# Patient Record
Sex: MALE | Race: ASIAN | NOT HISPANIC OR LATINO | URBAN - METROPOLITAN AREA
[De-identification: names, ages, dates, MRNs, and addresses within clinical notes are randomized per-mention and may not be internally consistent; named-entity substitution may affect disease eponyms.]

---

## 2024-02-18 ENCOUNTER — INPATIENT (INPATIENT)
Facility: HOSPITAL | Age: 64
LOS: 0 days | Discharge: HOME | DRG: 322 | End: 2024-02-19
Attending: INTERNAL MEDICINE | Admitting: INTERNAL MEDICINE
Payer: COMMERCIAL

## 2024-02-18 VITALS
RESPIRATION RATE: 18 BRPM | SYSTOLIC BLOOD PRESSURE: 148 MMHG | TEMPERATURE: 98 F | HEART RATE: 82 BPM | WEIGHT: 199.96 LBS | DIASTOLIC BLOOD PRESSURE: 80 MMHG | OXYGEN SATURATION: 99 %

## 2024-02-18 DIAGNOSIS — I20.0 UNSTABLE ANGINA: ICD-10-CM

## 2024-02-18 DIAGNOSIS — E78.5 HYPERLIPIDEMIA, UNSPECIFIED: ICD-10-CM

## 2024-02-18 LAB
ANION GAP SERPL CALC-SCNC: 8 MMOL/L — SIGNIFICANT CHANGE UP (ref 5–17)
ANISOCYTOSIS BLD QL: SLIGHT — SIGNIFICANT CHANGE UP
APTT BLD: 28.8 SEC — SIGNIFICANT CHANGE UP (ref 24.5–35.6)
BASOPHILS # BLD AUTO: 0.23 K/UL — HIGH (ref 0–0.2)
BASOPHILS NFR BLD AUTO: 1.2 % — SIGNIFICANT CHANGE UP (ref 0–2)
BUN SERPL-MCNC: 23 MG/DL — SIGNIFICANT CHANGE UP (ref 7–23)
CALCIUM SERPL-MCNC: 8.7 MG/DL — SIGNIFICANT CHANGE UP (ref 8.4–10.5)
CHLORIDE SERPL-SCNC: 100 MMOL/L — SIGNIFICANT CHANGE UP (ref 96–108)
CK MB CFR SERPL CALC: 2.1 NG/ML — SIGNIFICANT CHANGE UP (ref 0–6.7)
CK SERPL-CCNC: 92 U/L — SIGNIFICANT CHANGE UP (ref 30–200)
CO2 SERPL-SCNC: 28 MMOL/L — SIGNIFICANT CHANGE UP (ref 22–31)
CREAT SERPL-MCNC: 0.85 MG/DL — SIGNIFICANT CHANGE UP (ref 0.5–1.3)
DACRYOCYTES BLD QL SMEAR: SLIGHT — SIGNIFICANT CHANGE UP
EGFR: 99 ML/MIN/1.73M2 — SIGNIFICANT CHANGE UP
EOSINOPHIL # BLD AUTO: 0 K/UL — SIGNIFICANT CHANGE UP (ref 0–0.5)
EOSINOPHIL NFR BLD AUTO: 0 % — SIGNIFICANT CHANGE UP (ref 0–6)
GLUCOSE SERPL-MCNC: 146 MG/DL — HIGH (ref 70–99)
HCT VFR BLD CALC: 43.9 % — SIGNIFICANT CHANGE UP (ref 39–50)
HGB BLD-MCNC: 14.3 G/DL — SIGNIFICANT CHANGE UP (ref 13–17)
INR BLD: 0.94 — SIGNIFICANT CHANGE UP (ref 0.85–1.18)
ISTAT ACTK (ACTIVATED CLOTTING TIME KAOLIN): 255 SEC — HIGH (ref 74–137)
ISTAT ACTK (ACTIVATED CLOTTING TIME KAOLIN): 260 SEC — HIGH (ref 74–137)
ISTAT ACTK (ACTIVATED CLOTTING TIME KAOLIN): 277 SEC — HIGH (ref 74–137)
LYMPHOCYTES # BLD AUTO: 43.5 % — SIGNIFICANT CHANGE UP (ref 13–44)
LYMPHOCYTES # BLD AUTO: 8.43 K/UL — HIGH (ref 1–3.3)
MANUAL SMEAR VERIFICATION: SIGNIFICANT CHANGE UP
MCHC RBC-ENTMCNC: 30.4 PG — SIGNIFICANT CHANGE UP (ref 27–34)
MCHC RBC-ENTMCNC: 32.6 GM/DL — SIGNIFICANT CHANGE UP (ref 32–36)
MCV RBC AUTO: 93.2 FL — SIGNIFICANT CHANGE UP (ref 80–100)
MICROCYTES BLD QL: SLIGHT — SIGNIFICANT CHANGE UP
MONOCYTES # BLD AUTO: 0.91 K/UL — HIGH (ref 0–0.9)
MONOCYTES NFR BLD AUTO: 4.7 % — SIGNIFICANT CHANGE UP (ref 2–14)
NEUTROPHILS # BLD AUTO: 9.8 K/UL — HIGH (ref 1.8–7.4)
NEUTROPHILS NFR BLD AUTO: 50.6 % — SIGNIFICANT CHANGE UP (ref 43–77)
OVALOCYTES BLD QL SMEAR: SLIGHT — SIGNIFICANT CHANGE UP
PLAT MORPH BLD: NORMAL — SIGNIFICANT CHANGE UP
PLATELET # BLD AUTO: 237 K/UL — SIGNIFICANT CHANGE UP (ref 150–400)
POIKILOCYTOSIS BLD QL AUTO: SLIGHT — SIGNIFICANT CHANGE UP
POLYCHROMASIA BLD QL SMEAR: SLIGHT — SIGNIFICANT CHANGE UP
POTASSIUM SERPL-MCNC: 4.3 MMOL/L — SIGNIFICANT CHANGE UP (ref 3.5–5.3)
POTASSIUM SERPL-SCNC: 4.3 MMOL/L — SIGNIFICANT CHANGE UP (ref 3.5–5.3)
PROTHROM AB SERPL-ACNC: 10.7 SEC — SIGNIFICANT CHANGE UP (ref 9.5–13)
RBC # BLD: 4.71 M/UL — SIGNIFICANT CHANGE UP (ref 4.2–5.8)
RBC # FLD: 13.2 % — SIGNIFICANT CHANGE UP (ref 10.3–14.5)
RBC BLD AUTO: ABNORMAL
SMUDGE CELLS # BLD: PRESENT — SIGNIFICANT CHANGE UP
SODIUM SERPL-SCNC: 136 MMOL/L — SIGNIFICANT CHANGE UP (ref 135–145)
SPHEROCYTES BLD QL SMEAR: SLIGHT — SIGNIFICANT CHANGE UP
TROPONIN T, HIGH SENSITIVITY RESULT: 57 NG/L — CRITICAL HIGH (ref 0–51)
WBC # BLD: 19.37 K/UL — HIGH (ref 3.8–10.5)
WBC # FLD AUTO: 19.37 K/UL — HIGH (ref 3.8–10.5)

## 2024-02-18 PROCEDURE — 99152 MOD SED SAME PHYS/QHP 5/>YRS: CPT

## 2024-02-18 PROCEDURE — 99285 EMERGENCY DEPT VISIT HI MDM: CPT

## 2024-02-18 PROCEDURE — 93571 IV DOP VEL&/PRESS C FLO 1ST: CPT | Mod: 26,52,LD

## 2024-02-18 PROCEDURE — 93458 L HRT ARTERY/VENTRICLE ANGIO: CPT | Mod: 26,59

## 2024-02-18 PROCEDURE — 71045 X-RAY EXAM CHEST 1 VIEW: CPT | Mod: 26

## 2024-02-18 PROCEDURE — 92928 PRQ TCAT PLMT NTRAC ST 1 LES: CPT | Mod: RC

## 2024-02-18 RX ORDER — METOPROLOL TARTRATE 50 MG
1 TABLET ORAL
Refills: 0 | DISCHARGE

## 2024-02-18 RX ORDER — SODIUM CHLORIDE 9 MG/ML
1000 INJECTION INTRAMUSCULAR; INTRAVENOUS; SUBCUTANEOUS
Refills: 0 | Status: DISCONTINUED | OUTPATIENT
Start: 2024-02-18 | End: 2024-02-19

## 2024-02-18 RX ORDER — INFLUENZA VIRUS VACCINE 15; 15; 15; 15 UG/.5ML; UG/.5ML; UG/.5ML; UG/.5ML
0.5 SUSPENSION INTRAMUSCULAR ONCE
Refills: 0 | Status: DISCONTINUED | OUTPATIENT
Start: 2024-02-18 | End: 2024-02-19

## 2024-02-18 RX ORDER — CLOPIDOGREL BISULFATE 75 MG/1
75 TABLET, FILM COATED ORAL DAILY
Refills: 0 | Status: DISCONTINUED | OUTPATIENT
Start: 2024-02-19 | End: 2024-02-19

## 2024-02-18 RX ORDER — METOPROLOL TARTRATE 50 MG
25 TABLET ORAL DAILY
Refills: 0 | Status: DISCONTINUED | OUTPATIENT
Start: 2024-02-18 | End: 2024-02-19

## 2024-02-18 RX ORDER — CLOPIDOGREL BISULFATE 75 MG/1
600 TABLET, FILM COATED ORAL ONCE
Refills: 0 | Status: COMPLETED | OUTPATIENT
Start: 2024-02-18 | End: 2024-02-18

## 2024-02-18 RX ORDER — ATORVASTATIN CALCIUM 80 MG/1
80 TABLET, FILM COATED ORAL AT BEDTIME
Refills: 0 | Status: DISCONTINUED | OUTPATIENT
Start: 2024-02-18 | End: 2024-02-19

## 2024-02-18 RX ORDER — ASPIRIN/CALCIUM CARB/MAGNESIUM 324 MG
81 TABLET ORAL DAILY
Refills: 0 | Status: DISCONTINUED | OUTPATIENT
Start: 2024-02-19 | End: 2024-02-19

## 2024-02-18 RX ORDER — ASPIRIN/CALCIUM CARB/MAGNESIUM 324 MG
324 TABLET ORAL ONCE
Refills: 0 | Status: COMPLETED | OUTPATIENT
Start: 2024-02-18 | End: 2024-02-18

## 2024-02-18 RX ADMIN — Medication 324 MILLIGRAM(S): at 19:28

## 2024-02-18 RX ADMIN — SODIUM CHLORIDE 75 MILLILITER(S): 9 INJECTION INTRAMUSCULAR; INTRAVENOUS; SUBCUTANEOUS at 22:14

## 2024-02-18 RX ADMIN — ATORVASTATIN CALCIUM 80 MILLIGRAM(S): 80 TABLET, FILM COATED ORAL at 22:07

## 2024-02-18 RX ADMIN — CLOPIDOGREL BISULFATE 600 MILLIGRAM(S): 75 TABLET, FILM COATED ORAL at 19:27

## 2024-02-18 RX ADMIN — Medication 25 MILLIGRAM(S): at 22:08

## 2024-02-18 NOTE — PATIENT PROFILE ADULT - FALL HARM RISK - HARM RISK INTERVENTIONS

## 2024-02-18 NOTE — H&P ADULT - PROBLEM SELECTOR PLAN 1
- HsTrop T 57, CK/CKMB WNL   - s/p cardiac cath 2/18/24: RAISA pRCA 70 %, RAISA mRCA 99%, RAISA Ramus 80-90 %, dRCA oatent, mild LI LM, Mild disease LCx, pLAD stent mild ISR, mLAD stent mild ISR, 40-50 % stenosis prior to pLAD stent neg IFR, EDP 14, EF normal by ECHO, R TR access  - o/p TTE 02/2024 as per Dr. Bray: normal EF with no valve disease  - o/p NST 02/2024 as per Dr. Bray: large inferolateral ischemia  - s/p ASA/Plavix load in ER  - c/w ASA 81 mg daily, Plavix 75 mg daily, BB, statin  - f/u am A1c - HsTrop T 57, CK/CKMB WNL   - s/p cardiac cath 2/18/24: RAISA pRCA 70 %, RAISA mRCA 99%, RAISA Ramus 80-90 %, dRCA oatent, mild LI LM, Mild disease LCx, pLAD stent mild ISR, mLAD stent mild ISR, 40-50 % stenosis prior to pLAD stent neg IFR, EDP 14, EF normal by ECHO, R TR access  - o/p TTE 02/2024 as per Dr. Bray: normal EF with no valve disease  - o/p NST 02/2024 as per Dr. Bray: large inferolateral ischemia  - s/p ASA/Plavix load in ER  - c/w ASA 81 mg daily, Plavix 75 mg daily, Toprol 25 mg daily, statin  - f/u am A1c

## 2024-02-18 NOTE — H&P ADULT - HISTORY OF PRESENT ILLNESS
Pt seen post cath    Cardiologist: Dr. Bray  Pharmacy:    62 y/o M w/ PMH of HLD, CAD s/p PCI  who is referred to [facility] ED 2/18/24 by his cardiologist Dr. Bray with CC of CP X 10 days. Pt  in [place] visiting family and reports having on and off CP lasting few minutes before resolving on its own, , described as  burning/tightness which starts mid-sternal radiating to his L side and across his entire chest occurring w/ exercise and after eating, reports CP similar to prior CP when he had stents placed. Felt sth was wrong with his heart, flew back to [place] __. saw Dr. Bray, Rx Imdur/BB w/o relief. As per Dr. Bray o/p ECHO: normal EF w/ no valve disease, o/p NST with large inferolateral ischemia.  Pt, denies any SOB, dizziness, palpitation, N/V, LE edema, PND/orthopnea and syncope.  In ED, /80 HR 82 RR 18 T 98.1 02 99 RALabs revealed HsTropT 57, CK/CKMB WNL EKG: NSR @ 81 bpm, RBBB In ED, Pt received  mg PO x 1 and Plavix 600 mg PO x 1 and is now admitted to cardiac tele for UA  w/ plan for cardiac cath w/ possible intervention 2/2 known pt risk factors, CCS class 3 sx and abnormal NST.     Pt seen post cath    Cardiologist: Dr. Bray  Pharmacy: [facility]    64 y/o M w/ strong FHx of CAD,  PMH of HLD, CAD s/p PCI  who is referred to [facility] ED 2/18/24 by his cardiologist Dr. Bray with CC of CP X 10 days. Pt  in [place] visiting family 11/25-2/15 and reports having on and off CP lasting few minutes before resolving on its own, , described as  burning/tightness which starts mid-sternal radiating to his L side and across his entire chest occurring w/ exercise and after eating, reports CP similar to prior CP when he had stents placed. Felt sth was wrong with his heart, flew back to [place] Feb15, 2024. saw Dr. Bray, Rx SL NTG/BB w/o relief.  Pt As per Dr. Bray o/p ECHO: normal EF w/ no valve disease, o/p NST with large inferolateral ischemia.  Pt, denies any SOB, dizziness, palpitation, N/V, LE edema, PND/orthopnea and syncope.  In ED, /80 HR 82 RR 18 T 98.1 02 99 RALabs revealed HsTropT 57, CK/CKMB WNL EKG: NSR @ 81 bpm, RBBB In ED, Pt received  mg PO x 1 and Plavix 600 mg PO x 1 and is now admitted to cardiac tele for UA  w/ plan for cardiac cath w/ possible intervention 2/2 known pt risk factors, CCS class 3 sx and abnormal NST.

## 2024-02-18 NOTE — ED PROVIDER NOTE - OBJECTIVE STATEMENT
63yM w/ CAD (5 stents prior), HLD comes in for 10 days chest pain- started while he was in [place], began feeling burning and tightness across his entire chest which happened w/ exercise and after eating. No SOB. Says it felt similar and different to his prior chest pain when needed stents. Felt like something was wrong w/ his heart so flew back to [place].  Follows w/ Dr Patel who was giving pt imdur and metoprolol w/o relief so sent pt to [facility] for cardiac cath tonight for unstable angina.  Nonsmoker.

## 2024-02-18 NOTE — ED PROVIDER NOTE - CLINICAL SUMMARY MEDICAL DECISION MAKING FREE TEXT BOX
Normal VS here  pt w/ mild burning in his chest at this time  EKG normal sinus w/ RBBB  discussed w/ Dr Patel who is en route to hospital for cath  will load w/ aspirin/plavix, also will give statin  labs and cxr ordered Normal VS here  pt w/ mild burning in his chest at this time  EKG normal sinus w/ RBBB  discussed w/ Dr Diaz who is en route to hospital for cath  will load w/ aspirin/plavix, also will give statin  not concerned for PE based on presentation/no SOB as well as normal vitals (no tachycardia or hypoxia), plus pt flew back from [place] AFTER symptoms started in order to see dr diaz  labs and cxr ordered, signed out to cards team Normal VS here  pt w/ mild burning in his chest at this time  EKG normal sinus w/ RBBB (per Dr Diaz this is known RBBB)  discussed w/ Dr Diaz who is en route to hospital for cath  will load w/ aspirin/plavix, also will give statin  not concerned for PE based on presentation/no SOB as well as normal vitals (no tachycardia or hypoxia), plus pt flew back from [place] AFTER symptoms started in order to see dr diaz  labs and cxr ordered, signed out to cards team

## 2024-02-18 NOTE — ED ADULT NURSE NOTE - OBJECTIVE STATEMENT
Patient came to ER stating "I have had chest pain for 10 days but I was away and just came back and I was told to come to ER urgently". Patient c/o chest pain, no sob, dizziness or weakness.

## 2024-02-18 NOTE — H&P ADULT - NSICDXFAMILYHX_GEN_ALL_CORE_FT
FAMILY HISTORY:  Mother  Still living? Unknown  FH: CAD (coronary artery disease), Age at diagnosis: Age Unknown    Grandparent  Still living? Unknown  FH: myocardial infarction, Age at diagnosis: Age Unknown    Uncle  Still living? Unknown  FH: myocardial infarction, Age at diagnosis: Age Unknown

## 2024-02-18 NOTE — H&P ADULT - NSHPLABSRESULTS_GEN_ALL_CORE
14.3   19.37 )-----------( 237      ( 18 Feb 2024 19:01 )             43.9   02-18    136  |  100  |  23  ----------------------------<  146<H>  4.3   |  28  |  0.85    Ca    8.7      18 Feb 2024 19:01    PT/INR - ( 18 Feb 2024 19:01 )   PT: 10.7 sec;   INR: 0.94          PTT - ( 18 Feb 2024 19:01 )  PTT:28.8 sec

## 2024-02-18 NOTE — H&P ADULT - PROBLEM SELECTOR PLAN 2
- c/w Lipitor 80 mg daily IC from home med: Crestor 40 mg daily (non-formulary)  - f/u am lipid profile    DVT PPX: SCD  Dispo: monitor o/n, likely d/c in am.

## 2024-02-18 NOTE — H&P ADULT - ASSESSMENT
64 y/o M w/ PMH of HLD, CAD s/p PCI  who is now admitted to cardiac tele for UA  w/ plan for cardiac cath w/ possible intervention 2/2 known pt risk factors, CCS class 3 sx and abnormal NST.  62 y/o M w/ strong Fhx of CAD, PMH of HLD, CAD s/p PCI  who is now admitted to cardiac tele for UA  w/ plan for cardiac cath w/ possible intervention 2/2 known pt risk factors, CCS class 3 sx and abnormal NST.

## 2024-02-18 NOTE — ED PROVIDER NOTE - PHYSICAL EXAMINATION
CONST: nontoxic NAD speaking in full sentences  HEAD: atraumatic  EYES: conjunctivae clear  NECK: supple  CARD: regular rate  CHEST: breathing comfortably, no stridor/retractions/tripoding  EXT: FROM  SKIN: warm, dry  NEURO: awake alert answering questions following commands moving all extremities CONST: nontoxic NAD speaking in full sentences comfortable appearing  HEAD: atraumatic  EYES: conjunctivae clear  NECK: supple  CARD: regular rate  CHEST: breathing comfortably, no stridor/retractions/tripoding  EXT: FROM  SKIN: warm, dry  NEURO: awake alert answering questions following commands moving all extremities

## 2024-02-18 NOTE — ED ADULT NURSE NOTE - NSFALLHARMRISKINTERV_ED_ALL_ED

## 2024-02-18 NOTE — ED ADULT TRIAGE NOTE - BP NONINVASIVE DIASTOLIC (MM HG)
LOV: 5/10/21  Next OV: None  Rx: azithromycin (Zithromax Z-Alen) 250 MG tablet - 2 tabs day 1, then 1 tab days 2-5  Last refill date: 7/6/21  Qty: 60  # of refills: 0  Last labwork done: 9/28/20    Script loaded pending your approval.   80

## 2024-02-19 VITALS
HEART RATE: 94 BPM | RESPIRATION RATE: 18 BRPM | DIASTOLIC BLOOD PRESSURE: 82 MMHG | SYSTOLIC BLOOD PRESSURE: 128 MMHG | TEMPERATURE: 98 F | OXYGEN SATURATION: 99 %

## 2024-02-19 LAB
A1C WITH ESTIMATED AVERAGE GLUCOSE RESULT: 5.9 % — HIGH (ref 4–5.6)
ALBUMIN SERPL ELPH-MCNC: 3.6 G/DL — SIGNIFICANT CHANGE UP (ref 3.3–5)
ALP SERPL-CCNC: 75 U/L — SIGNIFICANT CHANGE UP (ref 40–120)
ALT FLD-CCNC: 15 U/L — SIGNIFICANT CHANGE UP (ref 10–45)
ANION GAP SERPL CALC-SCNC: 8 MMOL/L — SIGNIFICANT CHANGE UP (ref 5–17)
AST SERPL-CCNC: 22 U/L — SIGNIFICANT CHANGE UP (ref 10–40)
BASOPHILS # BLD AUTO: 0.06 K/UL — SIGNIFICANT CHANGE UP (ref 0–0.2)
BASOPHILS NFR BLD AUTO: 0.3 % — SIGNIFICANT CHANGE UP (ref 0–2)
BILIRUB SERPL-MCNC: 0.3 MG/DL — SIGNIFICANT CHANGE UP (ref 0.2–1.2)
BUN SERPL-MCNC: 14 MG/DL — SIGNIFICANT CHANGE UP (ref 7–23)
CALCIUM SERPL-MCNC: 8.3 MG/DL — LOW (ref 8.4–10.5)
CHLORIDE SERPL-SCNC: 103 MMOL/L — SIGNIFICANT CHANGE UP (ref 96–108)
CHOLEST SERPL-MCNC: 111 MG/DL — SIGNIFICANT CHANGE UP
CO2 SERPL-SCNC: 24 MMOL/L — SIGNIFICANT CHANGE UP (ref 22–31)
CREAT SERPL-MCNC: 0.69 MG/DL — SIGNIFICANT CHANGE UP (ref 0.5–1.3)
EGFR: 105 ML/MIN/1.73M2 — SIGNIFICANT CHANGE UP
EOSINOPHIL # BLD AUTO: 0.17 K/UL — SIGNIFICANT CHANGE UP (ref 0–0.5)
EOSINOPHIL NFR BLD AUTO: 0.9 % — SIGNIFICANT CHANGE UP (ref 0–6)
ESTIMATED AVERAGE GLUCOSE: 123 MG/DL — HIGH (ref 68–114)
GLUCOSE SERPL-MCNC: 106 MG/DL — HIGH (ref 70–99)
HCT VFR BLD CALC: 40.3 % — SIGNIFICANT CHANGE UP (ref 39–50)
HCV AB S/CO SERPL IA: 0.04 S/CO — SIGNIFICANT CHANGE UP
HCV AB SERPL-IMP: SIGNIFICANT CHANGE UP
HDLC SERPL-MCNC: 39 MG/DL — LOW
HGB BLD-MCNC: 13.1 G/DL — SIGNIFICANT CHANGE UP (ref 13–17)
IMM GRANULOCYTES NFR BLD AUTO: 0.3 % — SIGNIFICANT CHANGE UP (ref 0–0.9)
LIPID PNL WITH DIRECT LDL SERPL: 59 MG/DL — SIGNIFICANT CHANGE UP
LYMPHOCYTES # BLD AUTO: 10.38 K/UL — HIGH (ref 1–3.3)
LYMPHOCYTES # BLD AUTO: 58 % — HIGH (ref 13–44)
MAGNESIUM SERPL-MCNC: 2 MG/DL — SIGNIFICANT CHANGE UP (ref 1.6–2.6)
MCHC RBC-ENTMCNC: 29.8 PG — SIGNIFICANT CHANGE UP (ref 27–34)
MCHC RBC-ENTMCNC: 32.5 GM/DL — SIGNIFICANT CHANGE UP (ref 32–36)
MCV RBC AUTO: 91.8 FL — SIGNIFICANT CHANGE UP (ref 80–100)
MONOCYTES # BLD AUTO: 0.73 K/UL — SIGNIFICANT CHANGE UP (ref 0–0.9)
MONOCYTES NFR BLD AUTO: 4.1 % — SIGNIFICANT CHANGE UP (ref 2–14)
NEUTROPHILS # BLD AUTO: 6.52 K/UL — SIGNIFICANT CHANGE UP (ref 1.8–7.4)
NEUTROPHILS NFR BLD AUTO: 36.4 % — LOW (ref 43–77)
NON HDL CHOLESTEROL: 72 MG/DL — SIGNIFICANT CHANGE UP
NRBC # BLD: 0 /100 WBCS — SIGNIFICANT CHANGE UP (ref 0–0)
PLATELET # BLD AUTO: 207 K/UL — SIGNIFICANT CHANGE UP (ref 150–400)
POTASSIUM SERPL-MCNC: 4.2 MMOL/L — SIGNIFICANT CHANGE UP (ref 3.5–5.3)
POTASSIUM SERPL-SCNC: 4.2 MMOL/L — SIGNIFICANT CHANGE UP (ref 3.5–5.3)
PROT SERPL-MCNC: 6.5 G/DL — SIGNIFICANT CHANGE UP (ref 6–8.3)
RBC # BLD: 4.39 M/UL — SIGNIFICANT CHANGE UP (ref 4.2–5.8)
RBC # FLD: 13.6 % — SIGNIFICANT CHANGE UP (ref 10.3–14.5)
SODIUM SERPL-SCNC: 135 MMOL/L — SIGNIFICANT CHANGE UP (ref 135–145)
TRIGL SERPL-MCNC: 66 MG/DL — SIGNIFICANT CHANGE UP
TSH SERPL-MCNC: 1.23 UIU/ML — SIGNIFICANT CHANGE UP (ref 0.27–4.2)
WBC # BLD: 17.91 K/UL — HIGH (ref 3.8–10.5)
WBC # FLD AUTO: 17.91 K/UL — HIGH (ref 3.8–10.5)

## 2024-02-19 PROCEDURE — 80061 LIPID PANEL: CPT

## 2024-02-19 PROCEDURE — 85610 PROTHROMBIN TIME: CPT

## 2024-02-19 PROCEDURE — 84484 ASSAY OF TROPONIN QUANT: CPT

## 2024-02-19 PROCEDURE — 84443 ASSAY THYROID STIM HORMONE: CPT

## 2024-02-19 PROCEDURE — 80048 BASIC METABOLIC PNL TOTAL CA: CPT

## 2024-02-19 PROCEDURE — 36415 COLL VENOUS BLD VENIPUNCTURE: CPT

## 2024-02-19 PROCEDURE — 80053 COMPREHEN METABOLIC PANEL: CPT

## 2024-02-19 PROCEDURE — 85347 COAGULATION TIME ACTIVATED: CPT

## 2024-02-19 PROCEDURE — 82553 CREATINE MB FRACTION: CPT

## 2024-02-19 PROCEDURE — 99285 EMERGENCY DEPT VISIT HI MDM: CPT | Mod: 25

## 2024-02-19 PROCEDURE — 83036 HEMOGLOBIN GLYCOSYLATED A1C: CPT

## 2024-02-19 PROCEDURE — 85730 THROMBOPLASTIN TIME PARTIAL: CPT

## 2024-02-19 PROCEDURE — C1725: CPT

## 2024-02-19 PROCEDURE — 83735 ASSAY OF MAGNESIUM: CPT

## 2024-02-19 PROCEDURE — 71045 X-RAY EXAM CHEST 1 VIEW: CPT

## 2024-02-19 PROCEDURE — 85025 COMPLETE CBC W/AUTO DIFF WBC: CPT

## 2024-02-19 PROCEDURE — C1894: CPT

## 2024-02-19 PROCEDURE — C1874: CPT

## 2024-02-19 PROCEDURE — C1887: CPT

## 2024-02-19 PROCEDURE — 82550 ASSAY OF CK (CPK): CPT

## 2024-02-19 PROCEDURE — C1769: CPT

## 2024-02-19 PROCEDURE — 86803 HEPATITIS C AB TEST: CPT

## 2024-02-19 RX ORDER — CLOPIDOGREL BISULFATE 75 MG/1
1 TABLET, FILM COATED ORAL
Qty: 30 | Refills: 11
Start: 2024-02-19 | End: 2025-02-12

## 2024-02-19 RX ORDER — ASPIRIN/CALCIUM CARB/MAGNESIUM 324 MG
1 TABLET ORAL
Refills: 0 | DISCHARGE

## 2024-02-19 RX ORDER — ASPIRIN/CALCIUM CARB/MAGNESIUM 324 MG
1 TABLET ORAL
Qty: 30 | Refills: 11
Start: 2024-02-19 | End: 2025-02-12

## 2024-02-19 RX ADMIN — Medication 25 MILLIGRAM(S): at 05:10

## 2024-02-19 RX ADMIN — Medication 81 MILLIGRAM(S): at 12:37

## 2024-02-19 RX ADMIN — CLOPIDOGREL BISULFATE 75 MILLIGRAM(S): 75 TABLET, FILM COATED ORAL at 12:37

## 2024-02-19 NOTE — DISCHARGE NOTE PROVIDER - CARE PROVIDER_API CALL
Oleksandr Bray  Interventional Cardiology  [address] [place] [id number]  Phone: [phone]  Fax: [phone]  Established Patient  Follow Up Time: 2 weeks

## 2024-02-19 NOTE — DISCHARGE NOTE PROVIDER - NSDCCPCAREPLAN_GEN_ALL_CORE_FT
PRINCIPAL DISCHARGE DIAGNOSIS  Diagnosis: CAD (coronary artery disease)  Assessment and Plan of Treatment: -You underwent a cardiac catheterization 2/18/24 and the blockage in your RCA and ramus arteries were opened with stent placement. NEVER MISS A DOSE OF ASPIRIN OR PLAVIX; IF YOU DO, YOU ARE AT RISK OF YOUR STENT CLOSING AND HAVING A HEART ATTACK. DO NOT STOP THESE TWO MEDICATIONS UNLESS INSTRUCTED TO DO SO BY YOUR CARDIOLOGIST. Your procedure was done through your wrist. You do not need to keep this area covered and you may shower. Please do not scrub the area, let the water wash over it when you shower. Please avoid any heavy lifting  (no more than 3 to 5 lbs) or strenuous activity for five days. If you develop any swelling, bleeding, hardening of the skin (hematoma formation), acute pain, numbness/tingling  in your arm or leg please contact your doctor immediately or call our 24/7 line: [phone] Please return to the hospital/seek immediate medical attention if worsening of symptoms- including not limited to chest pain, shortness of breath. Please follow up with Dr. Bray in 1-2 weeks. Please call their office and make an appointment to see them. Please continue a heart healthy diet low in sodium, cholesterol, and fat.  Please take aspirin and plavix daily. The possible side effects of these medications include increased bleeding risk and easy bruising. However, the benefits of preventing stent closure are greater than this risk, so you are advised to take these medications.

## 2024-02-19 NOTE — DISCHARGE NOTE PROVIDER - NSDCMRMEDTOKEN_GEN_ALL_CORE_FT
Aspirin EC 81 mg oral delayed release tablet: 1 tab(s) orally once a day  Crestor 40 mg oral tablet: 1 tab(s) orally once a day  nitroglycerin 0.4 mg sublingual tablet: 1 tab(s) sublingually 3 times a day as needed for  chest pain  Toprol-XL 25 mg oral tablet, extended release: 1 tab(s) orally once a day   Aspirin EC 81 mg oral delayed release tablet: 1 tab(s) orally once a day  Cardiac Rehab: 3x/week for 12 weeks  Crestor 40 mg oral tablet: 1 tab(s) orally once a day  nitroglycerin 0.4 mg sublingual tablet: 1 tab(s) sublingually 3 times a day as needed for  chest pain  Plavix 75 mg oral tablet: 1 tab(s) orally once a day  Toprol-XL 25 mg oral tablet, extended release: 1 tab(s) orally once a day   Aspirin EC 81 mg oral delayed release tablet: 1 tab(s) orally once a day  benzonatate 200 mg oral capsule: 1 cap(s) orally 3 times a day  Cardiac Rehab: 3x/week for 12 weeks  Crestor 40 mg oral tablet: 1 tab(s) orally once a day  nitroglycerin 0.4 mg sublingual tablet: 1 tab(s) sublingually 3 times a day as needed for  chest pain  Plavix 75 mg oral tablet: 1 tab(s) orally once a day  Toprol-XL 25 mg oral tablet, extended release: 1 tab(s) orally once a day

## 2024-02-19 NOTE — DISCHARGE NOTE NURSING/CASE MANAGEMENT/SOCIAL WORK - PATIENT PORTAL LINK FT
You can access the FollowMyHealth Patient Portal offered by [facility] by registering at the following website: [email or web address]. By joining [employer]’s FollowMyHealth portal, you will also be able to view your health information using other applications (apps) compatible with our system.

## 2024-02-19 NOTE — DISCHARGE NOTE NURSING/CASE MANAGEMENT/SOCIAL WORK - NSDCPEFALRISK_GEN_ALL_CORE
For information on Fall & Injury Prevention, visit: [email or web address] OR  [email or web address] OR  https://www.cdc.gov/steadi/patient.html

## 2024-02-19 NOTE — DISCHARGE NOTE PROVIDER - HOSPITAL COURSE
64 y/o M w/ strong FHx of CAD,  PMH of HLD, CAD s/p PCI  who is referred to [facility] ED 2/18/24 by his cardiologist Dr. Bray with CC of CP X 10 days. Pt  in [place] visiting family 11/25-2/15 and reports having on and off CP lasting few minutes before resolving on its own, , described as  burning/tightness which starts mid-sternal radiating to his L side and across his entire chest occurring w/ exercise and after eating, reports CP similar to prior CP when he had stents placed. Felt sth was wrong with his heart, flew back to [place] Feb15, 2024. saw Dr. Bray, Rx SL NTG/BB w/o relief.  Pt As per Dr. Bray o/p ECHO: normal EF w/ no valve disease, o/p NST with large inferolateral ischemia. In ED, /80 HR 82 RR 18 T 98.1 02 99 RALabs revealed HsTropT 57, CK/CKMB WNL EKG: NSR @ 81 bpm, RBBB In ED, Pt received  mg PO x 1 and Plavix 600 mg PO x 1 and is now admitted to cardiac tele for UA  w/ plan for cardiac cath w/ possible intervention 2/2 known pt risk factors, CCS class 3 sx and abnormal NST.      s/p cardiac cath 2/18/24: RAISA pRCA 70 %, RAISA mRCA 99%, RAISA Ramus 80-90 %, dRCA patent, mild LI LM, Mild disease LCx, pLAD stent mild ISR, mLAD stent mild ISR, 40-50 % stenosis prior to pLAD stent neg IFR, EDP 14,   EF normal by ECHO per Dr Bray    Pt is asymptomatic at this time and denies chest pain, SOB, HASKINS, palpitations, dizziness, LOC, N/V, diaphoresis, orthopnea/PND, and leg swelling. Pt able to ambulate and void without complication. VSS. Labs and telemetry reviewed. Right radial access site stable and dressing C/D/I.  Pt is a candidate for discharge per Dr. Calderon. Pt given appropriate discharge instructions, pt states they have an appropriate amount of their previous home meds unchanged from this visit at home, and any new medications were sent to their pharmacy. Pt instructed to f/u with Dr Bray in 1-2 weeks.           Cardiac Rehab (STEMI/NSTEMI/ACS/Unstable Angina/CHF/Post PCI):            *Education on benefits of Cardiac Rehab provided to patient: Yes         *Referral and Prescription Given for Cardiac Rehab : Yes         *Pt given list of locations & instructed to contact their insurance company to review list of participating providers    Statin Prescribed (STEMI/NSTEMI/UA &/OR PCI this admission):  Yes  DAPT: Prescriptions for Aspirin/Plavix/Brilinta/Effient e-prescribed to patient's pharmacy Yes    GLP-1 receptor agonist/SGLT2 inhibitor meds discussed w/ patient and encouraged to discuss further with PMD or Endocrinologist at next visit.      Pt discharge copies detail cardiovascular history, medications, testing/treatments, OR patient has created a patient portal account and instructed to provide their records at their 1st appointment.   62 y/o M w/ strong FHx of CAD,  PMH of HLD, CAD s/p PCI  who is referred to [facility] ED 2/18/24 by his cardiologist Dr. Bray with CC of CP X 10 days. Pt  in [place] visiting family 11/25-2/15 and reports having on and off CP lasting few minutes before resolving on its own, , described as  burning/tightness which starts mid-sternal radiating to his L side and across his entire chest occurring w/ exercise and after eating, reports CP similar to prior CP when he had stents placed. Felt something was wrong with his heart, flew back to [place] Feb15, 2024. saw Dr. Bray, Rx SL NTG/BB w/o relief.  Pt As per Dr. Bray o/p ECHO: normal EF w/ no valve disease, o/p NST with large inferolateral ischemia. In ED, /80 HR 82 RR 18 T 98.1 02 99 RALabs revealed HsTropT 57, CK/CKMB WNL EKG: NSR @ 81 bpm, RBBB In ED, Pt received  mg PO x 1 and Plavix 600 mg PO x 1 and is now admitted to cardiac tele for UA  w/ plan for cardiac cath w/ possible intervention 2/2 known pt risk factors, CCS class 3 sx and abnormal NST.      s/p cardiac cath 2/18/24: RAISA pRCA 70 %, RAISA mRCA 99%, RAISA Ramus 80-90 %, dRCA patent, mild LI LM, Mild disease LCx, pLAD stent mild ISR, mLAD stent mild ISR, 40-50 % stenosis prior to pLAD stent neg IFR, EDP 14,   EF normal by ECHO per Dr Bray    Pt is asymptomatic at this time and denies chest pain, SOB, HASKINS, palpitations, dizziness, LOC, N/V, diaphoresis, orthopnea/PND, and leg swelling. Pt able to ambulate and void without complication. VSS. Labs and telemetry reviewed. Right radial access site stable and dressing C/D/I.  Pt is a candidate for discharge per Dr. Calderon. Pt given appropriate discharge instructions, pt states they have an appropriate amount of their previous home meds unchanged from this visit at home, and any new medications were sent to their pharmacy. Pt instructed to f/u with Dr Mousa in 1-2 weeks.           Cardiac Rehab (STEMI/NSTEMI/ACS/Unstable Angina/CHF/Post PCI):            *Education on benefits of Cardiac Rehab provided to patient: Yes         *Referral and Prescription Given for Cardiac Rehab : Yes         *Pt given list of locations & instructed to contact their insurance company to review list of participating providers    Statin Prescribed (STEMI/NSTEMI/UA &/OR PCI this admission):  Yes  DAPT: Prescriptions for Aspirin/Plavix/Brilinta/Effient e-prescribed to patient's pharmacy Yes    GLP-1 receptor agonist/SGLT2 inhibitor meds discussed w/ patient and encouraged to discuss further with PMD or Endocrinologist at next visit.      Pt discharge copies detail cardiovascular history, medications, testing/treatments, OR patient has created a patient portal account and instructed to provide their records at their 1st appointment.   64 y/o M w/ strong FHx of CAD,  PMH of HLD, CAD s/p PCI  who is referred to [facility] ED 2/18/24 by his cardiologist Dr. Bray with CC of CP X 10 days. Pt  in [place] visiting family 11/25-2/15 and reports having on and off CP lasting few minutes before resolving on its own, , described as  burning/tightness which starts mid-sternal radiating to his L side and across his entire chest occurring w/ exercise and after eating, reports CP similar to prior CP when he had stents placed. Felt something was wrong with his heart, flew back to [place] Feb15, 2024. saw Dr. Bray, Rx SL NTG/BB w/o relief.  Pt As per Dr. Bray o/p ECHO: normal EF w/ no valve disease, o/p NST with large inferolateral ischemia. In ED, /80 HR 82 RR 18 T 98.1 02 99 RALabs revealed HsTropT 57, CK/CKMB WNL EKG: NSR @ 81 bpm, RBBB In ED, Pt received  mg PO x 1 and Plavix 600 mg PO x 1 and is now admitted to cardiac tele for UA  w/ plan for cardiac cath w/ possible intervention 2/2 known pt risk factors, CCS class 3 sx and abnormal NST.      s/p cardiac cath 2/18/24: RAISA pRCA 70 %, RAISA mRCA 99%, RAISA Ramus 80-90 %, dRCA patent, mild LI LM, Mild disease LCx, pLAD stent mild in stent restenosis, mLAD stent mild in stent restenosis, 40-50 % stenosis prior to pLAD stent neg IFR, EDP 14,   EF normal by ECHO per Dr Bray    Pt is asymptomatic at this time and denies chest pain, SOB, HASKINS, palpitations, dizziness, LOC, N/V, diaphoresis, orthopnea/PND, and leg swelling. Pt able to ambulate and void without complication. VSS. Labs and telemetry reviewed. Right radial access site stable and dressing C/D/I.  Pt is a candidate for discharge per Dr. Calderon. Pt given appropriate discharge instructions, pt states they have an appropriate amount of their previous home meds unchanged from this visit at home, and any new medications were sent to their pharmacy. Pt instructed to f/u with Dr Bray in 1-2 weeks.           Cardiac Rehab (STEMI/NSTEMI/ACS/Unstable Angina/CHF/Post PCI):            *Education on benefits of Cardiac Rehab provided to patient: Yes         *Referral and Prescription Given for Cardiac Rehab : Yes         *Pt given list of locations & instructed to contact their insurance company to review list of participating providers    Statin Prescribed (STEMI/NSTEMI/UA &/OR PCI this admission):  Yes  DAPT: Prescriptions for Aspirin/Plavix/Brilinta/Effient e-prescribed to patient's pharmacy Yes    GLP-1 receptor agonist/SGLT2 inhibitor meds discussed w/ patient and encouraged to discuss further with PMD or Endocrinologist at next visit.      Pt discharge copies detail cardiovascular history, medications, testing/treatments, OR patient has created a patient portal account and instructed to provide their records at their 1st appointment.

## 2024-02-20 PROBLEM — E78.00 PURE HYPERCHOLESTEROLEMIA, UNSPECIFIED: Chronic | Status: ACTIVE | Noted: 2024-02-18

## 2024-02-21 ENCOUNTER — EMERGENCY (EMERGENCY)
Facility: HOSPITAL | Age: 64
LOS: 1 days | Discharge: HOME | End: 2024-02-21
Attending: EMERGENCY MEDICINE | Admitting: EMERGENCY MEDICINE
Payer: COMMERCIAL

## 2024-02-21 VITALS
TEMPERATURE: 99 F | DIASTOLIC BLOOD PRESSURE: 87 MMHG | RESPIRATION RATE: 17 BRPM | SYSTOLIC BLOOD PRESSURE: 135 MMHG | OXYGEN SATURATION: 96 % | HEART RATE: 83 BPM

## 2024-02-21 VITALS
TEMPERATURE: 98 F | HEART RATE: 88 BPM | RESPIRATION RATE: 16 BRPM | DIASTOLIC BLOOD PRESSURE: 84 MMHG | WEIGHT: 199.96 LBS | OXYGEN SATURATION: 99 % | SYSTOLIC BLOOD PRESSURE: 160 MMHG | HEIGHT: 67 IN

## 2024-02-21 DIAGNOSIS — U07.1 COVID-19: ICD-10-CM

## 2024-02-21 DIAGNOSIS — R07.9 CHEST PAIN, UNSPECIFIED: ICD-10-CM

## 2024-02-21 DIAGNOSIS — E78.5 HYPERLIPIDEMIA, UNSPECIFIED: ICD-10-CM

## 2024-02-21 DIAGNOSIS — I25.10 ATHEROSCLEROTIC HEART DISEASE OF NATIVE CORONARY ARTERY WITHOUT ANGINA PECTORIS: ICD-10-CM

## 2024-02-21 DIAGNOSIS — Z82.49 FAMILY HISTORY OF ISCHEMIC HEART DISEASE AND OTHER DISEASES OF THE CIRCULATORY SYSTEM: ICD-10-CM

## 2024-02-21 DIAGNOSIS — Z95.5 PRESENCE OF CORONARY ANGIOPLASTY IMPLANT AND GRAFT: ICD-10-CM

## 2024-02-21 LAB
FLUAV AG NPH QL: SIGNIFICANT CHANGE UP
FLUBV AG NPH QL: SIGNIFICANT CHANGE UP
RSV RNA NPH QL NAA+NON-PROBE: SIGNIFICANT CHANGE UP
SARS-COV-2 RNA SPEC QL NAA+PROBE: DETECTED

## 2024-02-21 PROCEDURE — 71045 X-RAY EXAM CHEST 1 VIEW: CPT

## 2024-02-21 PROCEDURE — 99285 EMERGENCY DEPT VISIT HI MDM: CPT | Mod: 25

## 2024-02-21 PROCEDURE — 87637 SARSCOV2&INF A&B&RSV AMP PRB: CPT

## 2024-02-21 PROCEDURE — 99284 EMERGENCY DEPT VISIT MOD MDM: CPT

## 2024-02-21 PROCEDURE — 71045 X-RAY EXAM CHEST 1 VIEW: CPT | Mod: 26

## 2024-02-21 RX ORDER — ROSUVASTATIN CALCIUM 5 MG/1
1 TABLET ORAL
Refills: 0 | DISCHARGE

## 2024-02-21 RX ORDER — NITROGLYCERIN 6.5 MG
1 CAPSULE, EXTENDED RELEASE ORAL
Refills: 0 | DISCHARGE

## 2024-02-21 RX ADMIN — Medication 100 MILLIGRAM(S): at 12:14

## 2024-02-21 NOTE — ED PROVIDER NOTE - NSFOLLOWUPINSTRUCTIONS_ED_ALL_ED_FT
Take cough medication as prescribed in addition to your other medications.  Isolate for at least 5 days from the start of your symptoms.  Return to ED with any worsening cough, shortness of breath, other concerns.    COVID-19  COVID-19 is an infection caused by a virus called SARS-CoV-2. Most people who get COVID-19 have mild to moderate symptoms. Some have little to no symptoms. In others, the virus may cause a severe infection.    What are the causes?  The human body, showing how the coronavirus travels from the air to a person's lungs.  COVID-19 is caused by a coronavirus. The virus may be in the air as droplets or as tiny specks of fluid (aerosols). It may also be on surfaces. You may catch the virus if you:  Breathe in droplets when a person with COVID-19 breathes, speaks, sings, coughs, or sneezes.  Touch something that has the virus on it and then touch your mouth, nose, or eyes.  What increases the risk?  Risk for infection:    You are more likely to get COVID-19 if:  You are within 6 ft (1.8 m) of a person who has COVID-19 for 15 minutes or longer.  You provide care to a person who has COVID-19.  You are in close contact with others. This includes hugging, kissing, or sharing utensils.  Risk for serious illness caused by COVID-19:    You are more likely to get very ill from COVID-19 if:  You have cancer.  You have a long-term (chronic) disease. This may be:  A chronic lung disease, such as pulmonary embolism, chronic obstructive pulmonary disease (COPD), or cystic fibrosis.  A disease that affects your body's defense system (immune system). If you have a weak immune system, you are said to be immunocompromised.  A serious heart condition, such as heart failure, coronary artery disease, or cardiomyopathy.  Diabetes.  Chronic kidney disease.  A liver disease, such as cirrhosis, nonalcoholic fatty liver disease, alcoholic liver disease, or autoimmune hepatitis.  You are obese.  You are pregnant or were just pregnant.  You have sickle cell disease.  What are the signs or symptoms?  Symptoms of COVID-19 can range from mild to severe. They may appear any time from 2 to 14 days after you are exposed. They include:  Fever or chills.  Shortness of breath or trouble breathing.  Feeling tired.  Headaches, body aches, or muscle aches.  A runny or stuffy nose.  Sneezing, coughing, or a sore throat.  New loss of taste or smell.  You may also have stomach problems, such as nausea, vomiting, or diarrhea.    In some cases, you may not have any symptoms.    How is this diagnosed?  A sample being collected by swabbing the nose.  COVID-19 may be diagnosed by testing a sample to check for the virus. The most common tests are the PCR test and the antigen test. Tests may be done in the lab or at home. They include:  Using a swab to take a sample of fluid from your nose.  Testing a sample of saliva from your mouth.  Testing a sample of mucus from your lungs (sputum).  How is this treated?  Treatment for COVID-19 depends on how severe your condition is.  Mild symptoms can be treated at home. You should rest, drink fluids, and take over-the-counter medicine.  If you have symptoms and risk factors, you may be prescribed a medicine that fights viruses (antiviral).  Severe symptoms may be treated in a hospital intensive care unit (ICU). Treatment may include:  Extra oxygen given through a tube in the nose, a face mask, or a brennan.  Medicines. These may include:  Antivirals, such as remdesivir.  Anti-inflammatories, such as corticosteroids. These help reduce inflammation.  Antithrombotics. These help prevent or treat blood clots.  Convalescent plasma. This helps boost your immune system.  Prone positioning. This is when you are laid on your stomach to help oxygen get into your lungs.  Infection control measures.  If you are at risk for a more serious illness, your health care provider may prescribe two medicines to help your immune system protect you. These are called long-acting monoclonal antibodies. They are given together every 6 months.    How is this prevented?  To protect yourself:    Get the vaccine or vaccine series if you meet the guidelines. You can even get the vaccine while you are pregnant or making breast milk (lactating).  Get an added dose of the vaccine if you are immunocompromised. This applies if you have had an organ transplant or if you have a condition that affects your immune system.  You should get the added dose 4 weeks after you got the first one.  If you get an mRNA vaccine, you will need to get 3 doses.  Talk to your provider about getting experimental monoclonal antibodies. This treatment can help prevent severe illness. It may be given to you if:  You are immunocompromised.  You cannot get the vaccine. You may not get the vaccine if you have a severe allergic reaction to it or to what it is made of.  You are not fully vaccinated.  You are in a place where there is COVID-19 and:  You are in close contact with someone who has COVID-19.  You are at high risk of being exposed.  You are at risk of illness from new variants of the virus.  To protect others:    If you have symptoms of COVID-19, take steps to stop the virus from spreading.  Stay home. Leave your house only to get medical care. Do not use public transit.  Do not travel while you are sick.  Wash your hands often with soap and water for at least 20 seconds. If soap and water are not available, use alcohol-based hand .  Make sure that all people in your household wash their hands well and often.  Cough or sneeze into a tissue or your sleeve or elbow. Do not cough or sneeze into your hand or into the air.  Where to find more information  Centers for Disease Control and Prevention (CDC): cdc.gov  World Health Organization (WHO): who.int  Get help right away if:  You have trouble breathing.  You have pain or pressure in your chest.  You are confused.  Your lips or fingernails turn blue.  You have trouble waking from sleep.  Your symptoms get worse.  These symptoms may be an emergency. Get help right away. Call 911.  Do not wait to see if the symptoms will go away.  Do not drive yourself to the hospital.

## 2024-02-21 NOTE — ED ADULT TRIAGE NOTE - CHIEF COMPLAINT QUOTE
Pt present to the ED for fever, cough, and diarrhea. PT states "I had stents placed on Sunday here and now I don't feel good." Pt denies CP/SOB.

## 2024-02-21 NOTE — ED PROVIDER NOTE - CLINICAL SUMMARY MEDICAL DECISION MAKING FREE TEXT BOX
62 y/o M w/ strong FHx of CAD,  PMH of HLD, CAD s/p PCI  who is referred to [facility] ED 2/18/24 by his cardiologist Dr. Bray with CC of CP X 10 days. Pt  in [place] visiting family 11/25-2/15 and reports having on and off CP lasting few minutes before resolving on its own, , described as  burning/tightness which starts mid-sternal radiating to his L side and across his entire chest occurring w/ exercise and after eating, reports CP similar to prior CP when he had stents placed. Felt something was wrong with his heart, flew back to [place] Feb15, 2024. saw Dr. Bray, Rx SL NTG/BB w/o relief.   Pt had stents placed on prior admission.  Now presents to ED with cough, congestion, fever, bodyaches and diarrhea x 1 day.  Denies nausea or vomiting.  No chest pain or shortness of breath.  Pt has been taking medications.    VSS  Pt with mild nasal congestion otherwise well appearing  Flu/covid/rsv swap  Xray neg for pna  most likely viral syndrome  supportive care with cough meds 64 y/o M w/ strong FHx of CAD,  PMH of HLD, CAD s/p PCI  who is referred to [facility] ED 2/18/24 by his cardiologist Dr. Bray with CC of CP X 10 days. Pt  in [place] visiting family 11/25-2/15 and reports having on and off CP lasting few minutes before resolving on its own, , described as  burning/tightness which starts mid-sternal radiating to his L side and across his entire chest occurring w/ exercise and after eating, reports CP similar to prior CP when he had stents placed. Felt something was wrong with his heart, flew back to [place] Feb15, 2024. saw Dr. Bray, Rx SL NTG/BB w/o relief.   Pt had stents placed on prior admission.  Now presents to ED with cough, congestion, fever, bodyaches and diarrhea x 1 day.  Denies nausea or vomiting.  No chest pain or shortness of breath.  Pt has been taking medications.    VSS  Pt with mild nasal congestion otherwise well appearing  Flu/covid/rsv swab    COVID pos  Pt aware prior to dc  Supportive care and isolation   Xray neg for pna  most likely viral syndrome  supportive care with cough meds

## 2024-02-21 NOTE — ED ADULT NURSE NOTE - NSFALLUNIVINTERV_ED_ALL_ED
Bed/Stretcher in lowest position, wheels locked, appropriate side rails in place/Call bell, personal items and telephone in reach/Instruct patient to call for assistance before getting out of bed/chair/stretcher/Non-slip footwear applied when patient is off stretcher/[place] to call system/Physically safe environment - no spills, clutter or unnecessary equipment/Purposeful proactive rounding/Room/bathroom lighting operational, light cord in reach

## 2024-02-21 NOTE — ED ADULT NURSE NOTE - OBJECTIVE STATEMENT
Patient to ED c/o cough, congestion, fevers and diarrhea since recent hospitalization admission r/t cardiac stent placement x 4 days ago. Denies chest pain, sob, dizziness. Ambulatory, AAOx4, NAD.

## 2024-02-21 NOTE — ED PROVIDER NOTE - OBJECTIVE STATEMENT
62 y/o M w/ strong FHx of CAD,  PMH of HLD, CAD s/p PCI  who is referred to [facility] ED 2/18/24 by his cardiologist Dr. Bray with CC of CP X 10 days. Pt  in [place] visiting family 11/25-2/15 and reports having on and off CP lasting few minutes before resolving on its own, , described as  burning/tightness which starts mid-sternal radiating to his L side and across his entire chest occurring w/ exercise and after eating, reports CP similar to prior CP when he had stents placed. Felt something was wrong with his heart, flew back to [place] Feb15, 2024. saw Dr. Bray, Rx SL NTG/BB w/o relief.   Pt had stents placed on prior admission.  Now presents to ED with 62 y/o M w/ strong FHx of CAD,  PMH of HLD, CAD s/p PCI  who is referred to [facility] ED 2/18/24 by his cardiologist Dr. Bray with CC of CP X 10 days. Pt  in [place] visiting family 11/25-2/15 and reports having on and off CP lasting few minutes before resolving on its own, , described as  burning/tightness which starts mid-sternal radiating to his L side and across his entire chest occurring w/ exercise and after eating, reports CP similar to prior CP when he had stents placed. Felt something was wrong with his heart, flew back to [place] Feb15, 2024. saw Dr. Bray, Rx SL NTG/BB w/o relief.   Pt had stents placed on prior admission.  Now presents to ED with cough, congestion, fever, bodyaches and diarrhea x 1 day.  Denies nausea or vomiting.  No chest pain or shortness of breath.  Pt has been taking medications.

## 2024-03-04 DIAGNOSIS — Z79.82 LONG TERM (CURRENT) USE OF ASPIRIN: ICD-10-CM

## 2024-03-04 DIAGNOSIS — I45.10 UNSPECIFIED RIGHT BUNDLE-BRANCH BLOCK: ICD-10-CM

## 2024-03-04 DIAGNOSIS — Z79.899 OTHER LONG TERM (CURRENT) DRUG THERAPY: ICD-10-CM

## 2024-03-04 DIAGNOSIS — E78.00 PURE HYPERCHOLESTEROLEMIA, UNSPECIFIED: ICD-10-CM

## 2024-03-04 DIAGNOSIS — Z95.5 PRESENCE OF CORONARY ANGIOPLASTY IMPLANT AND GRAFT: ICD-10-CM

## 2024-03-04 DIAGNOSIS — I25.110 ATHEROSCLEROTIC HEART DISEASE OF NATIVE CORONARY ARTERY WITH UNSTABLE ANGINA PECTORIS: ICD-10-CM

## 2024-03-04 DIAGNOSIS — I10 ESSENTIAL (PRIMARY) HYPERTENSION: ICD-10-CM

## 2024-03-04 DIAGNOSIS — I25.84 CORONARY ATHEROSCLEROSIS DUE TO CALCIFIED CORONARY LESION: ICD-10-CM

## 2024-03-04 DIAGNOSIS — T82.855A STENOSIS OF CORONARY ARTERY STENT, INITIAL ENCOUNTER: ICD-10-CM

## 2024-10-27 ENCOUNTER — INPATIENT (INPATIENT)
Facility: HOSPITAL | Age: 64
LOS: 0 days | Discharge: HOME | End: 2024-10-28
Attending: STUDENT IN AN ORGANIZED HEALTH CARE EDUCATION/TRAINING PROGRAM | Admitting: STUDENT IN AN ORGANIZED HEALTH CARE EDUCATION/TRAINING PROGRAM
Payer: COMMERCIAL

## 2024-10-27 VITALS
RESPIRATION RATE: 18 BRPM | HEIGHT: 61.81 IN | OXYGEN SATURATION: 99 % | SYSTOLIC BLOOD PRESSURE: 133 MMHG | HEART RATE: 85 BPM | TEMPERATURE: 98 F | DIASTOLIC BLOOD PRESSURE: 93 MMHG | WEIGHT: 199.96 LBS

## 2024-10-27 DIAGNOSIS — E78.5 HYPERLIPIDEMIA, UNSPECIFIED: ICD-10-CM

## 2024-10-27 DIAGNOSIS — D72.829 ELEVATED WHITE BLOOD CELL COUNT, UNSPECIFIED: ICD-10-CM

## 2024-10-27 DIAGNOSIS — Z95.5 PRESENCE OF CORONARY ANGIOPLASTY IMPLANT AND GRAFT: Chronic | ICD-10-CM

## 2024-10-27 DIAGNOSIS — I20.0 UNSTABLE ANGINA: ICD-10-CM

## 2024-10-27 LAB
ADD ON TEST-SPECIMEN IN LAB: SIGNIFICANT CHANGE UP
ALBUMIN SERPL ELPH-MCNC: 4.4 G/DL — SIGNIFICANT CHANGE UP (ref 3.3–5)
ALP SERPL-CCNC: 87 U/L — SIGNIFICANT CHANGE UP (ref 40–120)
ALT FLD-CCNC: 18 U/L — SIGNIFICANT CHANGE UP (ref 10–45)
ANION GAP SERPL CALC-SCNC: 9 MMOL/L — SIGNIFICANT CHANGE UP (ref 5–17)
APPEARANCE UR: CLEAR — SIGNIFICANT CHANGE UP
AST SERPL-CCNC: 24 U/L — SIGNIFICANT CHANGE UP (ref 10–40)
BASOPHILS # BLD AUTO: 0.14 K/UL — SIGNIFICANT CHANGE UP (ref 0–0.2)
BASOPHILS NFR BLD AUTO: 1 % — SIGNIFICANT CHANGE UP (ref 0–2)
BILIRUB SERPL-MCNC: 0.2 MG/DL — SIGNIFICANT CHANGE UP (ref 0.2–1.2)
BILIRUB UR-MCNC: NEGATIVE — SIGNIFICANT CHANGE UP
BUN SERPL-MCNC: 14 MG/DL — SIGNIFICANT CHANGE UP (ref 7–23)
BURR CELLS BLD QL SMEAR: PRESENT — SIGNIFICANT CHANGE UP
CALCIUM SERPL-MCNC: 8.8 MG/DL — SIGNIFICANT CHANGE UP (ref 8.4–10.5)
CHLORIDE SERPL-SCNC: 103 MMOL/L — SIGNIFICANT CHANGE UP (ref 96–108)
CK MB CFR SERPL CALC: 2 NG/ML — SIGNIFICANT CHANGE UP (ref 0–6.7)
CK MB CFR SERPL CALC: 2.1 NG/ML — SIGNIFICANT CHANGE UP (ref 0–6.7)
CK SERPL-CCNC: 117 U/L — SIGNIFICANT CHANGE UP (ref 30–200)
CK SERPL-CCNC: 119 U/L — SIGNIFICANT CHANGE UP (ref 30–200)
CO2 SERPL-SCNC: 28 MMOL/L — SIGNIFICANT CHANGE UP (ref 22–31)
COLOR SPEC: YELLOW — SIGNIFICANT CHANGE UP
CREAT SERPL-MCNC: 0.78 MG/DL — SIGNIFICANT CHANGE UP (ref 0.5–1.3)
DIFF PNL FLD: NEGATIVE — SIGNIFICANT CHANGE UP
EGFR: 101 ML/MIN/1.73M2 — SIGNIFICANT CHANGE UP
EOSINOPHIL # BLD AUTO: 0.29 K/UL — SIGNIFICANT CHANGE UP (ref 0–0.5)
EOSINOPHIL NFR BLD AUTO: 2 % — SIGNIFICANT CHANGE UP (ref 0–6)
GLUCOSE SERPL-MCNC: 96 MG/DL — SIGNIFICANT CHANGE UP (ref 70–99)
GLUCOSE UR QL: NEGATIVE MG/DL — SIGNIFICANT CHANGE UP
HCT VFR BLD CALC: 41.1 % — SIGNIFICANT CHANGE UP (ref 39–50)
HGB BLD-MCNC: 13.6 G/DL — SIGNIFICANT CHANGE UP (ref 13–17)
KETONES UR-MCNC: NEGATIVE MG/DL — SIGNIFICANT CHANGE UP
LEUKOCYTE ESTERASE UR-ACNC: NEGATIVE — SIGNIFICANT CHANGE UP
LYMPHOCYTES # BLD AUTO: 61 % — HIGH (ref 13–44)
LYMPHOCYTES # BLD AUTO: 8.8 K/UL — HIGH (ref 1–3.3)
MANUAL SMEAR VERIFICATION: SIGNIFICANT CHANGE UP
MCHC RBC-ENTMCNC: 30.4 PG — SIGNIFICANT CHANGE UP (ref 27–34)
MCHC RBC-ENTMCNC: 33.1 GM/DL — SIGNIFICANT CHANGE UP (ref 32–36)
MCV RBC AUTO: 91.9 FL — SIGNIFICANT CHANGE UP (ref 80–100)
MONOCYTES # BLD AUTO: 0.43 K/UL — SIGNIFICANT CHANGE UP (ref 0–0.9)
MONOCYTES NFR BLD AUTO: 3 % — SIGNIFICANT CHANGE UP (ref 2–14)
NEUTROPHILS # BLD AUTO: 4.33 K/UL — SIGNIFICANT CHANGE UP (ref 1.8–7.4)
NEUTROPHILS NFR BLD AUTO: 30 % — LOW (ref 43–77)
NITRITE UR-MCNC: NEGATIVE — SIGNIFICANT CHANGE UP
OVALOCYTES BLD QL SMEAR: SLIGHT — SIGNIFICANT CHANGE UP
PH UR: 6.5 — SIGNIFICANT CHANGE UP (ref 5–8)
PLAT MORPH BLD: NORMAL — SIGNIFICANT CHANGE UP
PLATELET # BLD AUTO: 171 K/UL — SIGNIFICANT CHANGE UP (ref 150–400)
POIKILOCYTOSIS BLD QL AUTO: SLIGHT — SIGNIFICANT CHANGE UP
POTASSIUM SERPL-MCNC: 4.1 MMOL/L — SIGNIFICANT CHANGE UP (ref 3.5–5.3)
POTASSIUM SERPL-SCNC: 4.1 MMOL/L — SIGNIFICANT CHANGE UP (ref 3.5–5.3)
PROT SERPL-MCNC: 7.2 G/DL — SIGNIFICANT CHANGE UP (ref 6–8.3)
PROT UR-MCNC: NEGATIVE MG/DL — SIGNIFICANT CHANGE UP
RBC # BLD: 4.47 M/UL — SIGNIFICANT CHANGE UP (ref 4.2–5.8)
RBC # FLD: 13.4 % — SIGNIFICANT CHANGE UP (ref 10.3–14.5)
RBC BLD AUTO: ABNORMAL
SMUDGE CELLS # BLD: PRESENT — SIGNIFICANT CHANGE UP
SODIUM SERPL-SCNC: 140 MMOL/L — SIGNIFICANT CHANGE UP (ref 135–145)
SP GR SPEC: 1.01 — SIGNIFICANT CHANGE UP (ref 1–1.03)
TROPONIN T, HIGH SENSITIVITY RESULT: 11 NG/L — SIGNIFICANT CHANGE UP (ref 0–51)
TROPONIN T, HIGH SENSITIVITY RESULT: 11 NG/L — SIGNIFICANT CHANGE UP (ref 0–51)
UROBILINOGEN FLD QL: 0.2 MG/DL — SIGNIFICANT CHANGE UP (ref 0.2–1)
VARIANT LYMPHS # BLD: 3 % — SIGNIFICANT CHANGE UP (ref 0–6)
WBC # BLD: 14.42 K/UL — HIGH (ref 3.8–10.5)
WBC # FLD AUTO: 14.42 K/UL — HIGH (ref 3.8–10.5)

## 2024-10-27 PROCEDURE — 71045 X-RAY EXAM CHEST 1 VIEW: CPT | Mod: 26

## 2024-10-27 PROCEDURE — 99285 EMERGENCY DEPT VISIT HI MDM: CPT

## 2024-10-27 RX ORDER — ASPIRIN/MAG CARB/ALUMINUM AMIN 325 MG
81 TABLET ORAL DAILY
Refills: 0 | Status: DISCONTINUED | OUTPATIENT
Start: 2024-10-28 | End: 2024-10-28

## 2024-10-27 RX ORDER — CLOPIDOGREL 75 MG/1
75 TABLET ORAL ONCE
Refills: 0 | Status: COMPLETED | OUTPATIENT
Start: 2024-10-27 | End: 2024-10-27

## 2024-10-27 RX ORDER — CLOPIDOGREL 75 MG/1
75 TABLET ORAL DAILY
Refills: 0 | Status: DISCONTINUED | OUTPATIENT
Start: 2024-10-28 | End: 2024-10-28

## 2024-10-27 RX ORDER — ASPIRIN/MAG CARB/ALUMINUM AMIN 325 MG
81 TABLET ORAL ONCE
Refills: 0 | Status: COMPLETED | OUTPATIENT
Start: 2024-10-27 | End: 2024-10-27

## 2024-10-27 RX ORDER — RANOLAZINE 500 MG/1
500 TABLET, FILM COATED, EXTENDED RELEASE ORAL
Refills: 0 | Status: DISCONTINUED | OUTPATIENT
Start: 2024-10-27 | End: 2024-10-28

## 2024-10-27 RX ORDER — ROSUVASTATIN CALCIUM 10 MG
40 TABLET ORAL AT BEDTIME
Refills: 0 | Status: DISCONTINUED | OUTPATIENT
Start: 2024-10-27 | End: 2024-10-28

## 2024-10-27 RX ORDER — INFLUENZ VIR VAC TV P-SURF2003 15MCG/.5ML
0.5 SYRINGE (ML) INTRAMUSCULAR ONCE
Refills: 0 | Status: DISCONTINUED | OUTPATIENT
Start: 2024-10-27 | End: 2024-10-28

## 2024-10-27 RX ADMIN — Medication 40 MILLIGRAM(S): at 21:56

## 2024-10-27 RX ADMIN — CLOPIDOGREL 75 MILLIGRAM(S): 75 TABLET ORAL at 21:31

## 2024-10-27 RX ADMIN — Medication 81 MILLIGRAM(S): at 21:31

## 2024-10-27 RX ADMIN — RANOLAZINE 500 MILLIGRAM(S): 500 TABLET, FILM COATED, EXTENDED RELEASE ORAL at 23:42

## 2024-10-27 NOTE — ED ADULT NURSE REASSESSMENT NOTE - NS ED NURSE REASSESS COMMENT FT1
assumed pt care. pt is awake, alert, ox4. denies any chest pain, no SOb at this time. placed back on cardiac monitor. awaiting to be seen by provider.

## 2024-10-27 NOTE — H&P ADULT - ASSESSMENT
64M w/ strong FHx of CAD and PMHx of HLD, CAD s/p multiple PCIs (most recently 2/18/24 w/ RAISA x 2 p/mRCA and patent p/mLAD stents), and known RBBB, presents to [facility] ED c/o ____, now admitted to cardiac tele for management of unstable angina. 64M w/ strong FHx of CAD and PMHx of HLD, CAD s/p multiple PCIs (most recently 2/18/24 w/ RAISA x 2 p/mRCA, RAISA Ramus and patent p/mLAD stents), and known RBBB, presents to [facility] ED c/o ____, now admitted to cardiac tele for management of unstable angina. 64M w/ strong FHx of CAD and PMHx of HLD, CAD s/p multiple PCIs (most recently 2/18/24 w/ RAISA x 2 p/mRCA, RAISA Ramus and patent p/mLAD stents), and known RBBB, presents to [facility] ED c/o worsening exertional SSCP radiating to L shoulder, now admitted to cardiac tele for management of unstable angina. 64M w/ strong FHx of CAD and PMHx of HLD, CAD s/p multiple PCIs (most recently 2/18/24 w/ RAISA x 2 p/mRCA, RAISA Ramus and patent p/mLAD stents), and known RBBB, presents to [facility] ED c/o worsening exertional SSCP and now admitted to cardiac tele for management of unstable angina.

## 2024-10-27 NOTE — ED PROVIDER NOTE - CLINICAL SUMMARY MEDICAL DECISION MAKING FREE TEXT BOX
Unremarkable vitals here  Normal exam  EKG normal sinus rate 82 bpm, RBBB, no acute ischemia  Patient very high risk given his personal history of CAD and multiple stents, as well as exertional chest pain relieved by rest concerning for unstable angina  Plan for labs, Trop, CXR, admission to cardiology for further management    -->Labs w/ WBC 14, pt afebrile, no infectious symptoms. CXR no focal infiltrate. Trop negative. Other labs including LFTs, lipase unremarkable. Continue w/ cardiology admission

## 2024-10-27 NOTE — ED ADULT TRIAGE NOTE - NSWEIGHTCALCTOOLDRUG_GEN_A_CORE
47 y/o M PMHx alcoholic hepatitis, decompensated etOH cirrhosis (c/b ascites, SBP, and variceal hemorrhage),  latent TB (treated), h/o C. diff (2016, 2017), Vitiligo and T2DM sent to [facility] from hepatology clinic for elevated SCr based on outpatient labs.     #NISHI  - Pt with hemodynamically mediated Nishi in the setting of nausea/vomiting and poor PO intake. Possible HRS?  - Patient also noted to be on Bactrim for SBP ppx for resistant E.Coli and this will likely "raise" creatinine due to impaired secretion of creatinine.   - SCr 0.55 in 10/2019 and 0.65 in 4/2020. SCr: 1.9 on 5/27/20.  Last outpatient SCr increased to 2.31 on 6/1/20 and increased to SCr; 3.6 on repeat on 6/9/20  - SCr: 3.28 today improving (6/15/20)  - 6/10 - UA with bilirubin, hematuria, RBC: 35  - 6/10 Ulytes with Steffen: <35  - 6/10  Renal Sono with Renal parenchymal disease. No hydronephrosis.  - MELD Score: 41/ s/p MRCP on 6/12/20  - Pt on Octreotide, Albumin and Midodrine 5mg TID  - Continue current management  - Monitor renal function with strict intake and output monitoring      #Acidemia  - Pt with acidemia in the setting of alcoholic liver cirrhosis  - Serum HCO3: 13 today  - Continue  Bicitra 30ml BID        Anni Sims  Nephrology Fellow  Pager: [phone] 47 y/o M PMHx alcoholic hepatitis, decompensated etOH cirrhosis (c/b ascites, SBP, and variceal hemorrhage),  latent TB (treated), h/o C. diff (2016, 2017), Vitiligo and T2DM sent to [facility] from hepatology clinic for elevated SCr based on outpatient labs.     #NISHI  - Pt with hemodynamically mediated Nishi in the setting of nausea/vomiting and poor PO intake. Possible HRS?  - Patient also noted to be on Bactrim for SBP ppx for resistant E.Coli and this will likely "raise" creatinine due to impaired secretion of creatinine.   - SCr 0.55 in 10/2019 and 0.65 in 4/2020. SCr: 1.9 on 5/27/20.  Last outpatient SCr increased to 2.31 on 6/1/20 and increased to SCr; 3.6 on repeat on 6/9/20  - SCr: 3.28  (6/15/20)  - 6/10 - UA with bilirubin, hematuria, RBC: 35  - 6/10 Ulytes with Steffen: <35  - 6/10  Renal Sono with Renal parenchymal disease. No hydronephrosis.  - MELD Score: 41/ s/p MRCP on 6/12/20  - Pt on Octreotide, Albumin and Midodrine 5mg TID  - Continue current management  - Monitor renal function with strict intake and output monitoring      #Acidemia  - Pt with acidemia in the setting of alcoholic liver cirrhosis  - Serum HCO3: 13 today  - Continue  Bicitra 30ml BID        Anni Sims  Nephrology Fellow  Pager: [phone]  used

## 2024-10-27 NOTE — ED ADULT NURSE NOTE - NSFALLUNIVINTERV_ED_ALL_ED
Bed/Stretcher in lowest position, wheels locked, appropriate side rails in place/Call bell, personal items and telephone in reach/Instruct patient to call for assistance before getting out of bed/chair/stretcher/Non-slip footwear applied when patient is off stretcher/[place] to call system/Physically safe environment - no spills, clutter or unnecessary equipment/Purposeful proactive rounding/Room/bathroom lighting operational, light cord in reach

## 2024-10-27 NOTE — ED PROVIDER NOTE - OBJECTIVE STATEMENT
64-year-old male with strong FHx CAD and PMHx HLD, CAD s/p multiple PCI's (most recently 2/18/2024 with RAISA x 2), known RBBB, presents to ED for evaluation of chest pain x 10 days.  Patient reports he feels the pain in the substernal region and left shoulder, feels like a burning and is exertional.  Patient reports his decreased exercise tolerance and begins feeling chest pain with walking half a block.  Reports that prior to onset of this chest pain he was walking multiple blocks and even running.  Denies orthopnea, lower extremity edema, fevers, chills, infectious symptoms.  Home medications are aspirin 81, Plavix 75, Crestor 40, patient reports compliance with all medications    Follows with Dr. Patel outpatient

## 2024-10-27 NOTE — ED PROVIDER NOTE - PHYSICAL EXAMINATION
CONST: nontoxic NAD speaking in full sentences  HEAD: atraumatic  EYES: conjunctivae clear  NECK: supple  CARD: regular rate  CHEST: breathing comfortably, no stridor/retractions/tripoding, clear BS  ABD: soft nontender  EXT: FROM  SKIN: warm, dry  NEURO: awake alert answering questions following commands moving all extremities

## 2024-10-27 NOTE — H&P ADULT - PROBLEM SELECTOR PLAN 2
- f/u AM lipids  - continue Crestor 40mg HS    F: None  E: Replete if K<4 or Mag<2  N: DASH Diet  VTEppx: none 2/2 LHC in AM  CODE: full  Dispo: cardiac tele WBC 14 w/o left shift, afebrile and non toxic appearing  - CXR _  - f/u UA  - continue to monitor off abx WBC 14 w/o left shift, asymptomatic, afebrile and non toxic appearing  - CXR clear  - f/u UA  - continue to monitor off abx WBC 14 w/o left shift, asymptomatic, afebrile and non toxic appearing  - CXR clear  - UA unremarkable  - continue to monitor off abx

## 2024-10-27 NOTE — ED ADULT TRIAGE NOTE - CHIEF COMPLAINT QUOTE
"I have been having chest pain for about 10 days. It is getting worse." Hx 3 heart attacks (last in Feb). EKG obtained.

## 2024-10-27 NOTE — PATIENT PROFILE ADULT - FALL HARM RISK - HARM RISK INTERVENTIONS

## 2024-10-27 NOTE — PATIENT PROFILE ADULT - DO YOU FEEL UNSAFE AT HOME, WORK, OR SCHOOL?
Activity:  As tolerated. Diet:  As tolerated. Drink a lot of fluids. Medications: Take medications as prescribed for pain. May resume home medications as prescribed, including blood thinners. Other:  Blood in your urine is normal for a couple of days. Drink plenty of fluids to help clear the blood. Call if it does not clear up in 2-3 days or if it seems like there is a large amount of fresh blood. Signs and symptoms of a surgical infection:  - Pain: Intolerable after taking pain medication (some discomfort is normal)  - Fever: Temperature above 101* F. (Take your temperature if you have chills, shivering, or feel warm.)    Call your surgeon or go to the emergency room if you notice:  - Any signs of infection  - Increasing pain  - Difficulty breathing  - Uncontrolled vomiting  - Anything worrisome    Due to anesthesia:  Don't drive or use heavy equipment for 24 hours or while taking a narcotic. Don't make important decisions or sign legal papers for 24 hours. Don't drink alcohol for 24 hours or while taking a narcotic. Have someone stay with you overnight. Eat a light diet today.   Drink a lot of fluids today (if not restricted due to status)
no

## 2024-10-27 NOTE — H&P ADULT - PROBLEM SELECTOR PLAN 1
presents w/ ___, currently CP free and HD stable  - hsTrop T 11, CK/CKMB negative, f/u repeat CE  - EKG: NSR, RBBB and non ischemic (unchanged)  - prior outpt TTE 2/2024 w/ normal LVEF and no valvular dz, repeat TTE in AM  - NPO after MN for possible LHC in AM, pt consented  - c/w ASA 81mg QD, Plavix 75mg QD and Crestor 40mg HS  - start Ranexa 500mg BID presents w/ ___, currently CP free and HD stable  - hsTrop T 11, CK/CKMB negative, f/u repeat CE  - EKG: NSR, RBBB and non ischemic (unchanged)  - TTE 2/2024 w/ normal LVEF and no valvular dz, repeat TTE in AM  - LHC 2/18/24: RAISA mRCA (99%), RAISA pRCA (70%), RAISA Ramus (90%); LM minor dz, pLAD 40-50% (iFR 0.93), mLAD stents patent, D1 mild diffuse, LCx/OM1 mild diffuse, RPDA mild diffuse.  - NPO after MN for possible LHC in AM, pt consented  - c/w ASA 81mg QD, Plavix 75mg QD and Crestor 40mg HS  - start Ranexa 500mg BID presents w/ SSCP radiating to L shoulder that now occurs w/ <1 block and decreased ET; pt currently CP free and HD stable  - hsTrop T 11, CK/CKMB negative, f/u repeat CE  - EKG: NSR, RBBB and non ischemic (unchanged)  - TTE 2/2024 w/ normal LVEF and no valvular dz, repeat TTE in AM  - LHC 2/18/24: RAISA mRCA (99%), RAISA pRCA (70%), RAISA Ramus (90%); LM minor dz, pLAD 40-50% (iFR 0.93), mLAD stents patent, D1 mild diffuse, LCx/OM1 mild diffuse, RPDA mild diffuse.  - NPO after MN for possible LHC in AM, pt consented  - c/w ASA 81mg QD, Plavix 75mg QD and Crestor 40mg HS  - start Ranexa 500mg BID presents w/ worsening substernal chest tightness/burning w/ <1 block and associated fatigue and HA; pt currently CP free and HD stable  - hsTrop T 11, CK/CKMB negative, f/u repeat CE  - EKG: NSR, RBBB and non ischemic (unchanged)  - TTE 2/2024 w/ normal LVEF and no valvular dz, repeat TTE in AM  - [facility] 2/18/24: RAISA mRCA (99%), RAISA pRCA (70%), RAISA Ramus (90%); LM minor dz, pLAD 40-50% (iFR 0.93), mLAD stents patent, D1 mild diffuse, LCx/OM1 mild diffuse, RPDA mild diffuse.  - NPO after MN for possible LHC in AM  - c/w ASA 81mg QD, Plavix 75mg QD and Crestor 40mg HS  - start Ranexa 500mg BID

## 2024-10-27 NOTE — H&P ADULT - HISTORY OF PRESENT ILLNESS
64M w/ strong FHx of CAD and PMHx of HLD, CAD s/p multiple PCIs (most recently 2/18/24 w/ RAISA x 2 p/mRCA and patent p/mLAD stents), and known RBBB, presents to [facility] ED c/o ongoing CP x __. He reports DAPT compliance and denies any ___ palpitations, diaphoresis, fatigue, HASKINS, orthopnea, PND, LE edema, lightheadedness, dizziness, syncope, N/V/D, abd pain, melena, BRBPR, cough, congestion, fever, chills or recent sick contact.      ED Course:  VS - /93, HR 85bpm, T 97.9F, RR 18, SpO2 99% RA  Labs - hsTropT 11, CK/CKMB negative, WBC 14.42 w/o left shift, Cr wnl  EKG - NSR, RBBB and non ischemic (unchaged)  Intervention - ASA 81mg, Plavic 75mg and Crestor 40mg    Pt now admitted to cardiac tele for further management of unstable angina. 64M w/ strong FHx of CAD and PMHx of HLD, CAD s/p multiple PCIs (most recently 2/18/24 w/ RAISA x 2 p/mRCA, RAISA Ramus and patent p/mLAD stents), and known RBBB, presents to [facility] ED c/o ongoing CP x __. He reports DAPT compliance and denies any ___ palpitations, diaphoresis, fatigue, HASKINS, orthopnea, PND, LE edema, lightheadedness, dizziness, syncope, N/V/D, abd pain, melena, BRBPR, cough, congestion, fever, chills or recent sick contact.      ED Course:  VS - /93, HR 85bpm, T 97.9F, RR 18, SpO2 99% RA  Labs - hsTropT 11, CK/CKMB negative, WBC 14.42 w/o left shift, Cr wnl  EKG - NSR, RBBB and non ischemic (unchanged)  CXR - clear  Intervention - ASA 81mg, Plavix 75mg and Crestor 40mg    Pt now admitted to cardiac tele for further management of unstable angina. 64M w/ strong FHx of CAD and PMHx of HLD, CAD s/p multiple PCIs (most recently 2/18/24 w/ RAISA x 2 p/mRCA, RAISA Ramus and patent p/mLAD stents), and known RBBB, presents to [facility] ED c/o worsening exertional SSCP radiating to L shoulder x10 days. Pt describes CP as a burning sensation that occurs when walking < 1 block and reports decreased ET, he previously could walk multiple blocks before experiencing CP. He reports DAPT compliance and denies any palpitations, diaphoresis, fatigue, HASKINS, orthopnea, PND, LE edema, lightheadedness, dizziness, syncope, N/V/D, abd pain, melena, BRBPR, cough, congestion, fever, chills or recent sick contact.      ED Course:  VS - /93, HR 85bpm, T 97.9F, RR 18, SpO2 99% RA  Labs - hsTropT 11, CK/CKMB negative, WBC 14.42 w/o left shift, Cr wnl  EKG - NSR, RBBB and non ischemic (unchanged)  CXR - clear  Intervention - ASA 81mg, Plavix 75mg and Crestor 40mg    Pt now admitted to cardiac tele for further management of unstable angina. 64M w/ strong FHx of CAD and PMHx of HLD, CAD w/ prior MI and s/p multiple PCIs (most recently 2/18/24 w/ RAISA x 2 p/mRCA, RAISA Ramus and patent p/mLAD stents), and known RBBB, presents to [facility] ED c/o worsening exertional SSCP x10 days. Pt describes CP as a tightness and burning sensation, non radiating, that is now occurring when walking < 1 block and w/ associated fatigue and HA. He states that last week he could walk multiple blocks before experiencing CP and reports that chest discomfort isn't as severe as when he had his last PCI. He endorses DAPT compliance and jumps rope daily w/o any CP. Pt also denies any palpitations, diaphoresis, HASKINS, orthopnea, PND, LE edema, lightheadedness, dizziness, syncope, N/V/D, abd pain, melena, BRBPR, cough, congestion, fever, chills or recent sick contact. Pt saw his outpt cardiologist last week for similar symptoms where EKG was performed and unchanged.    ED Course:  VS - /93, HR 85bpm, T 97.9F, RR 18, SpO2 99% RA  Labs - hsTropT 11, CK/CKMB negative, WBC 14.42 w/o left shift, Cr wnl  EKG - NSR, RBBB and non ischemic (unchanged)  CXR - clear  Intervention - ASA 81mg, Plavix 75mg and Crestor 40mg    Pt now admitted to cardiac tele for further management of unstable angina.

## 2024-10-27 NOTE — H&P ADULT - PROBLEM SELECTOR PLAN 3
- f/u AM lipids  - continue Crestor 40mg HS    F: None  E: Replete if K<4 or Mag<2  N: DASH Diet  VTEppx: none 2/2 LHC in AM  CODE: full  Dispo: cardiac tele

## 2024-10-27 NOTE — H&P ADULT - NSHPLABSRESULTS_GEN_ALL_CORE
13.6   14.42 )-----------( 171      ( 27 Oct 2024 19:30 )             41.1       10-27    140  |  103  |  14  ----------------------------<  96  4.1   |  28  |  0.78    Ca    8.8      27 Oct 2024 19:30    TPro  7.2  /  Alb  4.4  /  TBili  0.2  /  DBili  x   /  AST  24  /  ALT  18  /  AlkPhos  87  10-27          CARDIAC MARKERS ( 27 Oct 2024 19:30 )  x     / x     / x     / x     / 2.1 ng/mL        Urinalysis Basic - ( 27 Oct 2024 19:30 )    Color: x / Appearance: x / SG: x / pH: x  Gluc: 96 mg/dL / Ketone: x  / Bili: x / Urobili: x   Blood: x / Protein: x / Nitrite: x   Leuk Esterase: x / RBC: x / WBC x   Sq Epi: x / Non Sq Epi: x / Bacteria: x        EKG:

## 2024-10-27 NOTE — ED ADULT NURSE NOTE - OBJECTIVE STATEMENT
patient c/o HASKINS, MSCP, and L CP with activity for past 10 days. Patient with no complaints at rest but states he gets chest pain & HASKINS with any activity. Patient states that he had MI x3 and has 8 cardiac stents

## 2024-10-27 NOTE — ED ADULT NURSE NOTE - NSFALLHARMRISKINTERV_ED_ALL_ED

## 2024-10-27 NOTE — H&P ADULT - NSICDXPASTMEDICALHX_GEN_ALL_CORE_FT
PAST MEDICAL HISTORY:  CAD (coronary artery disease)     History of MI (myocardial infarction)     Hypercholesteremia

## 2024-10-27 NOTE — ED ADULT NURSE NOTE - HIV OFFER
Previously Declined (within the last year) [Mother] : mother [Yes] : Patient goes to dentist yearly [Toothpaste] : Primary Fluoride Source: Toothpaste [Up to date] : Up to date [Normal] : normal [LMP: _____] : LMP: [unfilled] [Days of Bleeding: _____] : Days of bleeding: [unfilled] [Age of Menarche: ____] : Age of Menarche: [unfilled] [Eats meals with family] : eats meals with family [Has family members/adults to turn to for help] : has family members/adults to turn to for help [Is permitted and is able to make independent decisions] : Is permitted and is able to make independent decisions [Grade: ____] : Grade: [unfilled] [Normal Performance] : normal performance [Normal Behavior/Attention] : normal behavior/attention [Normal Homework] : normal homework [Eats regular meals including adequate fruits and vegetables] : eats regular meals including adequate fruits and vegetables [Drinks non-sweetened liquids] : drinks non-sweetened liquids  [Calcium source] : calcium source [Has friends] : has friends [At least 1 hour of physical activity a day] : at least 1 hour of physical activity a day [Has interests/participates in community activities/volunteers] : has interests/participates in community activities/volunteers. [Uses safety belts/safety equipment] : uses safety belts/safety equipment  [Has peer relationships free of violence] : has peer relationships free of violence [No] : Patient has not had sexual intercourse [Has ways to cope with stress] : has ways to cope with stress [Displays self-confidence] : displays self-confidence [With Teen] : teen [Irregular menses] : no irregular menses [Heavy Bleeding] : no heavy bleeding [Painful Cramps] : no painful cramps [Tampon Use] : no tampon use [Sleep Concerns] : no sleep concerns [Has concerns about body or appearance] : does not have concerns about body or appearance [Screen time (except homework) less than 2 hours a day] : no screen time (except homework) less than 2 hours a day [Uses electronic nicotine delivery system] : does not use electronic nicotine delivery system [Uses tobacco] : does not use tobacco [Uses drugs] : does not use drugs  [Drinks alcohol] : does not drink alcohol [Impaired/distracted driving] : no impaired/distracted driving [Has problems with sleep] : does not have problems with sleep [Gets depressed, anxious, or irritable/has mood swings] : does not get depressed, anxious, or irritable/has mood swings [Has thought about hurting self or considered suicide] : has not thought about hurting self or considered suicide [FreeTextEntry7] : doing well [de-identified] : none at this time [de-identified] : aspires to be a teacher [de-identified] : likes to play drums

## 2024-10-28 VITALS
OXYGEN SATURATION: 98 % | RESPIRATION RATE: 18 BRPM | SYSTOLIC BLOOD PRESSURE: 120 MMHG | DIASTOLIC BLOOD PRESSURE: 76 MMHG | HEART RATE: 67 BPM

## 2024-10-28 LAB
A1C WITH ESTIMATED AVERAGE GLUCOSE RESULT: 5.8 % — HIGH (ref 4–5.6)
ALBUMIN SERPL ELPH-MCNC: 4.3 G/DL — SIGNIFICANT CHANGE UP (ref 3.3–5)
ALP SERPL-CCNC: 68 U/L — SIGNIFICANT CHANGE UP (ref 40–120)
ALT FLD-CCNC: 17 U/L — SIGNIFICANT CHANGE UP (ref 10–45)
ANION GAP SERPL CALC-SCNC: 8 MMOL/L — SIGNIFICANT CHANGE UP (ref 5–17)
ANION GAP SERPL CALC-SCNC: 9 MMOL/L — SIGNIFICANT CHANGE UP (ref 5–17)
APTT BLD: 30.5 SEC — SIGNIFICANT CHANGE UP (ref 24.5–35.6)
AST SERPL-CCNC: 21 U/L — SIGNIFICANT CHANGE UP (ref 10–40)
BASOPHILS # BLD AUTO: 0.05 K/UL — SIGNIFICANT CHANGE UP (ref 0–0.2)
BASOPHILS NFR BLD AUTO: 0.4 % — SIGNIFICANT CHANGE UP (ref 0–2)
BILIRUB SERPL-MCNC: 0.4 MG/DL — SIGNIFICANT CHANGE UP (ref 0.2–1.2)
BUN SERPL-MCNC: 10 MG/DL — SIGNIFICANT CHANGE UP (ref 7–23)
BUN SERPL-MCNC: 11 MG/DL — SIGNIFICANT CHANGE UP (ref 7–23)
CALCIUM SERPL-MCNC: 8.6 MG/DL — SIGNIFICANT CHANGE UP (ref 8.4–10.5)
CALCIUM SERPL-MCNC: 8.7 MG/DL — SIGNIFICANT CHANGE UP (ref 8.4–10.5)
CHLORIDE SERPL-SCNC: 105 MMOL/L — SIGNIFICANT CHANGE UP (ref 96–108)
CHLORIDE SERPL-SCNC: 105 MMOL/L — SIGNIFICANT CHANGE UP (ref 96–108)
CHOLEST SERPL-MCNC: 113 MG/DL — SIGNIFICANT CHANGE UP
CO2 SERPL-SCNC: 27 MMOL/L — SIGNIFICANT CHANGE UP (ref 22–31)
CO2 SERPL-SCNC: 27 MMOL/L — SIGNIFICANT CHANGE UP (ref 22–31)
CREAT SERPL-MCNC: 0.82 MG/DL — SIGNIFICANT CHANGE UP (ref 0.5–1.3)
CREAT SERPL-MCNC: 0.86 MG/DL — SIGNIFICANT CHANGE UP (ref 0.5–1.3)
EGFR: 98 ML/MIN/1.73M2 — SIGNIFICANT CHANGE UP
EGFR: 99 ML/MIN/1.73M2 — SIGNIFICANT CHANGE UP
EOSINOPHIL # BLD AUTO: 0.21 K/UL — SIGNIFICANT CHANGE UP (ref 0–0.5)
EOSINOPHIL NFR BLD AUTO: 1.6 % — SIGNIFICANT CHANGE UP (ref 0–6)
ESTIMATED AVERAGE GLUCOSE: 120 MG/DL — HIGH (ref 68–114)
GLUCOSE SERPL-MCNC: 108 MG/DL — HIGH (ref 70–99)
GLUCOSE SERPL-MCNC: 109 MG/DL — HIGH (ref 70–99)
HCT VFR BLD CALC: 42.2 % — SIGNIFICANT CHANGE UP (ref 39–50)
HDLC SERPL-MCNC: 40 MG/DL — LOW
HGB BLD-MCNC: 13.6 G/DL — SIGNIFICANT CHANGE UP (ref 13–17)
IMM GRANULOCYTES NFR BLD AUTO: 0.2 % — SIGNIFICANT CHANGE UP (ref 0–0.9)
INR BLD: 0.95 — SIGNIFICANT CHANGE UP (ref 0.85–1.16)
LIPID PNL WITH DIRECT LDL SERPL: 58 MG/DL — SIGNIFICANT CHANGE UP
LYMPHOCYTES # BLD AUTO: 67.8 % — HIGH (ref 13–44)
LYMPHOCYTES # BLD AUTO: 8.99 K/UL — HIGH (ref 1–3.3)
MAGNESIUM SERPL-MCNC: 2.2 MG/DL — SIGNIFICANT CHANGE UP (ref 1.6–2.6)
MCHC RBC-ENTMCNC: 30 PG — SIGNIFICANT CHANGE UP (ref 27–34)
MCHC RBC-ENTMCNC: 32.2 GM/DL — SIGNIFICANT CHANGE UP (ref 32–36)
MCV RBC AUTO: 93.2 FL — SIGNIFICANT CHANGE UP (ref 80–100)
MONOCYTES # BLD AUTO: 0.46 K/UL — SIGNIFICANT CHANGE UP (ref 0–0.9)
MONOCYTES NFR BLD AUTO: 3.5 % — SIGNIFICANT CHANGE UP (ref 2–14)
NEUTROPHILS # BLD AUTO: 3.51 K/UL — SIGNIFICANT CHANGE UP (ref 1.8–7.4)
NEUTROPHILS NFR BLD AUTO: 26.5 % — LOW (ref 43–77)
NON HDL CHOLESTEROL: 73 MG/DL — SIGNIFICANT CHANGE UP
NRBC # BLD: 0 /100 WBCS — SIGNIFICANT CHANGE UP (ref 0–0)
PLATELET # BLD AUTO: 164 K/UL — SIGNIFICANT CHANGE UP (ref 150–400)
POTASSIUM SERPL-MCNC: 3.9 MMOL/L — SIGNIFICANT CHANGE UP (ref 3.5–5.3)
POTASSIUM SERPL-MCNC: 4 MMOL/L — SIGNIFICANT CHANGE UP (ref 3.5–5.3)
POTASSIUM SERPL-SCNC: 3.9 MMOL/L — SIGNIFICANT CHANGE UP (ref 3.5–5.3)
POTASSIUM SERPL-SCNC: 4 MMOL/L — SIGNIFICANT CHANGE UP (ref 3.5–5.3)
PROT SERPL-MCNC: 6.7 G/DL — SIGNIFICANT CHANGE UP (ref 6–8.3)
PROTHROM AB SERPL-ACNC: 11.1 SEC — SIGNIFICANT CHANGE UP (ref 9.9–13.4)
RBC # BLD: 4.53 M/UL — SIGNIFICANT CHANGE UP (ref 4.2–5.8)
RBC # FLD: 13.4 % — SIGNIFICANT CHANGE UP (ref 10.3–14.5)
SODIUM SERPL-SCNC: 140 MMOL/L — SIGNIFICANT CHANGE UP (ref 135–145)
SODIUM SERPL-SCNC: 141 MMOL/L — SIGNIFICANT CHANGE UP (ref 135–145)
T4 AB SER-ACNC: 7.32 UG/DL — SIGNIFICANT CHANGE UP (ref 4.5–11.7)
TRIGL SERPL-MCNC: 71 MG/DL — SIGNIFICANT CHANGE UP
TSH SERPL-MCNC: 1.39 UIU/ML — SIGNIFICANT CHANGE UP (ref 0.27–4.2)
WBC # BLD: 13.25 K/UL — HIGH (ref 3.8–10.5)
WBC # FLD AUTO: 13.25 K/UL — HIGH (ref 3.8–10.5)

## 2024-10-28 PROCEDURE — 84436 ASSAY OF TOTAL THYROXINE: CPT

## 2024-10-28 PROCEDURE — A9500: CPT

## 2024-10-28 PROCEDURE — 80053 COMPREHEN METABOLIC PANEL: CPT

## 2024-10-28 PROCEDURE — 83690 ASSAY OF LIPASE: CPT

## 2024-10-28 PROCEDURE — 80048 BASIC METABOLIC PNL TOTAL CA: CPT

## 2024-10-28 PROCEDURE — 85025 COMPLETE CBC W/AUTO DIFF WBC: CPT

## 2024-10-28 PROCEDURE — 85610 PROTHROMBIN TIME: CPT

## 2024-10-28 PROCEDURE — 85730 THROMBOPLASTIN TIME PARTIAL: CPT

## 2024-10-28 PROCEDURE — 93306 TTE W/DOPPLER COMPLETE: CPT

## 2024-10-28 PROCEDURE — 82550 ASSAY OF CK (CPK): CPT

## 2024-10-28 PROCEDURE — 93016 CV STRESS TEST SUPVJ ONLY: CPT

## 2024-10-28 PROCEDURE — 78452 HT MUSCLE IMAGE SPECT MULT: CPT | Mod: MC

## 2024-10-28 PROCEDURE — 78452 HT MUSCLE IMAGE SPECT MULT: CPT | Mod: 26

## 2024-10-28 PROCEDURE — 83036 HEMOGLOBIN GLYCOSYLATED A1C: CPT

## 2024-10-28 PROCEDURE — 99285 EMERGENCY DEPT VISIT HI MDM: CPT

## 2024-10-28 PROCEDURE — 36415 COLL VENOUS BLD VENIPUNCTURE: CPT

## 2024-10-28 PROCEDURE — 93017 CV STRESS TEST TRACING ONLY: CPT

## 2024-10-28 PROCEDURE — 84443 ASSAY THYROID STIM HORMONE: CPT

## 2024-10-28 PROCEDURE — 80061 LIPID PANEL: CPT

## 2024-10-28 PROCEDURE — 71045 X-RAY EXAM CHEST 1 VIEW: CPT

## 2024-10-28 PROCEDURE — 84484 ASSAY OF TROPONIN QUANT: CPT

## 2024-10-28 PROCEDURE — 93306 TTE W/DOPPLER COMPLETE: CPT | Mod: 26

## 2024-10-28 PROCEDURE — 83735 ASSAY OF MAGNESIUM: CPT

## 2024-10-28 PROCEDURE — 81003 URINALYSIS AUTO W/O SCOPE: CPT

## 2024-10-28 PROCEDURE — 93018 CV STRESS TEST I&R ONLY: CPT

## 2024-10-28 PROCEDURE — 82553 CREATINE MB FRACTION: CPT

## 2024-10-28 PROCEDURE — 99239 HOSP IP/OBS DSCHRG MGMT >30: CPT

## 2024-10-28 RX ORDER — METOPROLOL TARTRATE 50 MG
0.5 TABLET ORAL
Qty: 15 | Refills: 2
Start: 2024-10-28 | End: 2025-01-25

## 2024-10-28 RX ORDER — ROSUVASTATIN CALCIUM 10 MG
1 TABLET ORAL
Qty: 30 | Refills: 2
Start: 2024-10-28 | End: 2025-01-25

## 2024-10-28 RX ORDER — ASPIRIN/MAG CARB/ALUMINUM AMIN 325 MG
1 TABLET ORAL
Qty: 30 | Refills: 6
Start: 2024-10-28 | End: 2025-05-25

## 2024-10-28 RX ORDER — CLOPIDOGREL 75 MG/1
1 TABLET ORAL
Qty: 30 | Refills: 6
Start: 2024-10-28 | End: 2025-05-25

## 2024-10-28 RX ORDER — RANOLAZINE 500 MG/1
1 TABLET, FILM COATED, EXTENDED RELEASE ORAL
Qty: 60 | Refills: 2
Start: 2024-10-28 | End: 2025-01-25

## 2024-10-28 RX ADMIN — CLOPIDOGREL 75 MILLIGRAM(S): 75 TABLET ORAL at 12:09

## 2024-10-28 RX ADMIN — RANOLAZINE 500 MILLIGRAM(S): 500 TABLET, FILM COATED, EXTENDED RELEASE ORAL at 05:32

## 2024-10-28 RX ADMIN — RANOLAZINE 500 MILLIGRAM(S): 500 TABLET, FILM COATED, EXTENDED RELEASE ORAL at 17:30

## 2024-10-28 RX ADMIN — Medication 81 MILLIGRAM(S): at 12:09

## 2024-10-28 NOTE — PROGRESS NOTE ADULT - SUBJECTIVE AND OBJECTIVE BOX
Cardiology PA Progress Note    S: Pt seen and examined bedside. Pt denies pain at this time. States only experiences chest burning at times when walking; can jump rope without issue.   Patient denies C/P, SOB, N/V, dizziness, palpitations, and diaphoresis.  Pt denies fever/chills, dysuria, abdominal pain, diarrhea, and cough  12 Point ROS otherwise negative except as per HPI/subjective.     O: Vital Signs Last 24 Hrs  T(C): 36.8 (28 Oct 2024 08:58), Max: 36.8 (28 Oct 2024 08:58)  T(F): 98.2 (28 Oct 2024 08:58), Max: 98.2 (28 Oct 2024 08:58)  HR: 65 (28 Oct 2024 08:58) (65 - 85)  BP: 120/76 (28 Oct 2024 08:58) (120/76 - 139/91)  BP(mean): 92 (28 Oct 2024 08:58) (92 - 108)  RR: 18 (28 Oct 2024 08:58) (16 - 18)  SpO2: 97% (28 Oct 2024 08:58) (96% - 99%)    Parameters below as of 28 Oct 2024 08:58  Patient On (Oxygen Delivery Method): room air    PHYSICAL EXAM:  GEN: NAD  HEENT: No JVD  PULM:  CTA B/L  CARD:  RRR, S1 and S2   ABD: +BS, NT, soft/ND	  EXT: No Edema B/L LE, radial pulses 2+ b/l, DP pulses 2+ b/l, PT pulses 2+ b/l, WWP x4 extremities   NEURO: A+Ox3, no focal deficit  PSYCH: Mood Appropriate    LABS:                        13.6   13.25 )-----------( 164      ( 28 Oct 2024 05:30 )             42.2     10-28    140  |  105  |  10  ----------------------------<  109[H]  3.9   |  27  |  0.82    Ca    8.6      28 Oct 2024 05:30  Mg     2.2     10-28    TPro  6.7  /  Alb  4.3  /  TBili  0.4  /  DBili  x   /  AST  21  /  ALT  17  /  AlkPhos  68  10-28    PT/INR - ( 28 Oct 2024 05:30 )   PT: 11.1 sec;   INR: 0.95          PTT - ( 28 Oct 2024 05:30 )  PTT:30.5 sec      Daily Height in cm: 157 (27 Oct 2024 19:07)    Daily

## 2024-10-28 NOTE — DISCHARGE NOTE PROVIDER - HOSPITAL COURSE
incomplete..    64M w/ strong FHx of CAD and PMHx of HLD, CAD s/p multiple PCIs (most recently 2/18/24 w/ RAISA x 2 p/mRCA, RAISA Ramus and patent p/mLAD stents), and known RBBB, presents to [facility] ED c/o worsening exertional SSCP and now admitted to cardiac tele for management of unstable angina.    Cardiac Testing:   - EST (___________): _______________________  - TTE (______): ______     Diagnostic Testing:   - CXR (10/27/24): unremarkable   - UA (10/27/24): unremarkable     Pt admitted overnight for observation and telemetry monitoring. Pt seen and examined at bedside this AM without any complaints or events overnight, VSS, labs and telemetry reviewed and pt stable for discharge as discussed with  ___. Pt has received appropriate discharge instructions, including medication regimen, access site management and follow up with  __ in 1-2 weeks.    Discharge medications: ASA 81mg QD, Plavix 75mg QD, ______________.    Cardiac Rehab (Post PCI): Education on benefits of Cardiac Rehab provided to patient: Yes, Referral and Prescription Given for Cardiac Rehab: Yes, Pt given list of locations & instructed to contact their insurance company to review list of participating providers.     GLP-1 receptor agonist/SGLT2 inhibitor meds discussed w/ patients and encouraged to discuss further with PMD or Endocrinologist at next visit.      Pt discharge copies detail cardiovascular history, medications, testing/treatments, OR patient has created a patient portal account and instructed to provide their records at their 1st appointment.     incomplete..    64M w/ strong FHx of CAD and PMHx of HLD, CAD s/p multiple PCIs (most recently 2/18/24 w/ RAISA x 2 p/mRCA, RAISA Ramus and patent p/mLAD stents), and known RBBB, presents to [facility] ED c/o worsening exertional SSCP and now admitted to cardiac tele for management of unstable angina.    Cardiac Testing:   - EST (10/28/24): Myocardial perfusion imaging is normal. Overall left ventricular systolic function is normal without regional wall motion abnormalities. The EKG stress test is normal. The Duke Treadmill Score is 9, predictive of a low 1-year risk for a cardiovascular event.    - TTE (10/28/24): LVEF  60-65%, no significant valvular dz, no pericardial effusion.     Diagnostic Testing:   - CXR (10/27/24): unremarkable   - UA (10/27/24): unremarkable     Pt admitted overnight for observation and telemetry monitoring. Pt seen and examined at bedside this AM without any complaints or events overnight, VSS, labs and telemetry reviewed and pt stable for discharge as discussed with Dr. Berry. Pt has received appropriate discharge instructions, including medication regimen, access site management and follow up with Dr. Bray in 1-2 weeks.    Discharge medications: ASA 81mg QD, Plavix 75mg QD, Crestor 40 mg QHS, Toprol XL 12.5 mg QD, Ranexa 500 mg BID     Cardiac Rehab (Post PCI): Education on benefits of Cardiac Rehab provided to patient: Yes, Referral and Prescription Given for Cardiac Rehab: Yes, Pt given list of locations & instructed to contact their insurance company to review list of participating providers.     GLP-1 receptor agonist/SGLT2 inhibitor meds discussed w/ patients and encouraged to discuss further with PMD or Endocrinologist at next visit.      Pt discharge copies detail cardiovascular history, medications, testing/treatments, OR patient has created a patient portal account and instructed to provide their records at their 1st appointment.     incomplete..    64M w/ strong FHx of CAD and PMHx of HLD, CAD s/p multiple PCIs (most recently 2/18/24 w/ RAISA x 2 p/mRCA, RAISA Ramus and patent p/mLAD stents), and known RBBB, presents to [facility] ED c/o worsening exertional SSCP and now admitted to cardiac tele for management of unstable angina.    Cardiac Testing:   - EST (10/28/24): Myocardial perfusion imaging is normal. Overall left ventricular systolic function is normal without regional wall motion abnormalities. The EKG stress test is normal. The Duke Treadmill Score is 9, predictive of a low 1-year risk for a cardiovascular event.    - TTE (10/28/24): LVEF  60-65%, no significant valvular dz, no pericardial effusion.     Diagnostic Testing:   - CXR (10/27/24): unremarkable   - UA (10/27/24): unremarkable     Pt admitted overnight for observation and telemetry monitoring. Pt seen and examined at bedside this AM without any complaints or events overnight, VSS, labs and telemetry reviewed and pt stable for discharge as discussed with Dr. Berry. Pt has received appropriate discharge instructions, including medication regimen and follow up with Dr. Bray in 1-2 weeks.    Discharge medications: ASA 81mg QD, Plavix 75mg QD, Crestor 40 mg QHS, Toprol XL 12.5 mg QD, Ranexa 500 mg BID     Cardiac Rehab (Post PCI): Education on benefits of Cardiac Rehab provided to patient: Yes, Referral and Prescription Given for Cardiac Rehab: Yes, Pt given list of locations & instructed to contact their insurance company to review list of participating providers.     GLP-1 receptor agonist/SGLT2 inhibitor meds discussed w/ patients and encouraged to discuss further with PMD or Endocrinologist at next visit.      Pt discharge copies detail cardiovascular history, medications, testing/treatments, OR patient has created a patient portal account and instructed to provide their records at their 1st appointment.

## 2024-10-28 NOTE — DISCHARGE NOTE NURSING/CASE MANAGEMENT/SOCIAL WORK - FINANCIAL ASSISTANCE
[facility] provides services at a reduced cost to those who are determined to be eligible through [facility]’s financial assistance program. Information regarding [facility]’s financial assistance program can be found by going to [email or web address] or by calling [phone].

## 2024-10-28 NOTE — PROGRESS NOTE ADULT - PROBLEM SELECTOR PLAN 3
, TG 71, HDL 40, LDL 58   - Continue: Crestor 40mg QHS    F: None  E: Replete if K<4 or Mag<2  N: DASH Diet  VTEppx: none 2/2 LHC in AM  CODE: full  Dispo: cardiac tele , TG 71, HDL 40, LDL 58   - Continue: Crestor 40mg QHS    F: None  E: Replete if K<4 or Mag<2  N: DASH Diet  VTEppx: none 2/2 LHC in AM  CODE: full  Dispo: cardiac tele    Case discussed with Dr. Berry.

## 2024-10-28 NOTE — PROGRESS NOTE ADULT - PROBLEM SELECTOR PLAN 2
WBC 14 > 13.25 w/o left shift, asymptomatic, afebrile and nontoxic-appearing  - CXR (10/27/24): unremarkable   - UA (10/27/24): unremarkable   - continue to monitor off abx

## 2024-10-28 NOTE — DISCHARGE NOTE NURSING/CASE MANAGEMENT/SOCIAL WORK - NSDCPEFALRISK_GEN_ALL_CORE
For information on Fall & Injury Prevention, visit: [email or web address] OR  [email or web address] OR  https://www.cdc.gov/steadi/patient.html

## 2024-10-28 NOTE — DISCHARGE NOTE NURSING/CASE MANAGEMENT/SOCIAL WORK - PATIENT PORTAL LINK FT
You can access the FollowMyHealth Patient Portal offered by [facility] by registering at the following website: [email or web address]. By joining [facility]’s FollowMyHealth portal, you will also be able to view your health information using other applications (apps) compatible with our system.

## 2024-10-28 NOTE — DISCHARGE NOTE PROVIDER - NSDCCPCAREPLAN_GEN_ALL_CORE_FT
PRINCIPAL DISCHARGE DIAGNOSIS  Diagnosis: CAD (coronary artery disease)  Assessment and Plan of Treatment:       SECONDARY DISCHARGE DIAGNOSES  Diagnosis: Leukocytosis  Assessment and Plan of Treatment:     Diagnosis: HLD (hyperlipidemia)  Assessment and Plan of Treatment:      PRINCIPAL DISCHARGE DIAGNOSIS  Diagnosis: CAD (coronary artery disease)  Assessment and Plan of Treatment: You have a history of coronary artery disease. You underwent a stress test on 10/28/24 which was normal.  Your stress EKG was normal. Your heart function was normal.   You also underwent an echocardiogram (ultrasound of your heart) on 10/28/24 which showed a normal pumping function with an ejection fraction (EF) of 60-65% with no disease of your valves.   Please START taking Ranexa 500 mg twice a day and please START taking Metoprolol succinate 12.5 mg daily to control your chest pain.   Please follow up with Dr. Bray your cardiologist within 1-2 weeks.      SECONDARY DISCHARGE DIAGNOSES  Diagnosis: Leukocytosis  Assessment and Plan of Treatment: Your white blood cell count, which could indicate inflammation or infection, is elevated. It went from 14 to 13. Your chest x-ray was unremarkable on 10/27/24 and your urinalysis on 10/27/24 did not show an infection.   Please follow up with your primary care doctor for recheck of your white blood cell count in 1-2 weeks.   If you experience fevers, chills, shortness of breath, or any other infectious symptoms, please seek immediate medical attention.    Diagnosis: HLD (hyperlipidemia)  Assessment and Plan of Treatment: Please continue Crestor 40 mg daily at bedtime to keep your cholesterol low. High cholesterol contributes to heart disease.

## 2024-10-28 NOTE — DISCHARGE NOTE PROVIDER - ATTENDING DISCHARGE PHYSICAL EXAMINATION:
Mr. Peters is a 64M with history of CAD presented with exertional chest pain and unstable angina.     Exam:  JVP normal  Lungs CTA  S1, S2, no murmurs  WWP, no edema    CAD- DAPT, statin, ranexa, bb  HTN- Add bb.  HLD- statin  Leukocytosis- no systemic symptoms

## 2024-10-28 NOTE — PROGRESS NOTE ADULT - PROBLEM SELECTOR PLAN 1
presents w/ worsening substernal chest tightness/burning w/ <1 block and associated fatigue and HA; pt currently CP free and HD stable  - hsTrop T 11 > 11, CK/CKMB negative x2  - Cardiac risk labs: A1c 5.8%, TSH wnl, LDL 58  - EKG: NSR, RBBB and non ischemic (unchanged)  - TTE 2/2024 w/ normal LVEF and no valvular dz, repeat TTE in AM  - LHC 2/18/24: RAISA mRCA (99%), RAISA pRCA (70%), RAISA Ramus (90%); LM minor dz, pLAD 40-50% (iFR 0.93), mLAD stents patent, D1 mild diffuse, LCx/OM1 mild diffuse, RPDA mild diffuse.  - NPO after MN for possible LHC in AM  - Continue: ASA 81mg QD, Plavix 75mg QD and Crestor 40mg HS  - Initiate: Ranexa 500mg BID - HsTrop T 11 > 11, CK/CKMB negative x2  - Cardiac risk labs: A1c 5.8%, TSH wnl, LDL 58  - EKG: NSR, RBBB and non ischemic (unchanged)  - TTE (10/28/24): LVEF 60-65%, no RWMA.   - LHC (2/18/24): RAISA mRCA (99%), RAISA pRCA (70%), RAISA Ramus (90%); LM minor dz, pLAD 40-50% (iFR 0.93), mLAD stents patent, D1 mild diffuse, LCx/OM1 mild diffuse, RPDA mild diffuse.  - Continue: ASA 81mg QD, Plavix 75mg QD and Crestor 40mg HS  - Initiate: Ranexa 500mg BID  - F/u NST

## 2024-10-28 NOTE — DISCHARGE NOTE PROVIDER - CARE PROVIDER_API CALL
Oleksandr Bray  Interventional Cardiology  [address] [place] [id number]  Phone: [phone]  Fax: [phone]  Established Patient  Follow Up Time: 1 week

## 2024-10-28 NOTE — PROGRESS NOTE ADULT - ASSESSMENT
64M w/ strong FHx of CAD and PMHx of HLD, CAD s/p multiple PCIs (most recently 2/18/24 w/ RAISA x 2 p/mRCA, RAISA Ramus and patent p/mLAD stents), and known RBBB, admitted to cardiac telemetry for treatment of unstable angina.

## 2024-10-28 NOTE — DISCHARGE NOTE PROVIDER - NSDCMRMEDTOKEN_GEN_ALL_CORE_FT
Aspirin EC 81 mg oral delayed release tablet: 1 tab(s) orally once a day  Crestor 40 mg oral tablet: 1 tab(s) orally once a day  Plavix 75 mg oral tablet: 1 tab(s) orally once a day   Aspirin EC 81 mg oral delayed release tablet: 1 tab(s) orally once a day  Crestor 40 mg oral tablet: 1 tab(s) orally once a day (at bedtime)  Metoprolol Succinate ER 25 mg oral tablet, extended release: 0.5 tab(s) orally once a day  Plavix 75 mg oral tablet: 1 tab(s) orally once a day  Ranexa 500 mg oral tablet, extended release: 1 tab(s) orally 2 times a day

## 2024-11-05 DIAGNOSIS — Z79.82 LONG TERM (CURRENT) USE OF ASPIRIN: ICD-10-CM

## 2024-11-05 DIAGNOSIS — D72.829 ELEVATED WHITE BLOOD CELL COUNT, UNSPECIFIED: ICD-10-CM

## 2024-11-05 DIAGNOSIS — Z95.5 PRESENCE OF CORONARY ANGIOPLASTY IMPLANT AND GRAFT: ICD-10-CM

## 2024-11-05 DIAGNOSIS — I45.10 UNSPECIFIED RIGHT BUNDLE-BRANCH BLOCK: ICD-10-CM

## 2024-11-05 DIAGNOSIS — Z79.02 LONG TERM (CURRENT) USE OF ANTITHROMBOTICS/ANTIPLATELETS: ICD-10-CM

## 2024-11-05 DIAGNOSIS — R07.9 CHEST PAIN, UNSPECIFIED: ICD-10-CM

## 2024-11-05 DIAGNOSIS — Z82.49 FAMILY HISTORY OF ISCHEMIC HEART DISEASE AND OTHER DISEASES OF THE CIRCULATORY SYSTEM: ICD-10-CM

## 2024-11-05 DIAGNOSIS — E78.5 HYPERLIPIDEMIA, UNSPECIFIED: ICD-10-CM

## 2024-11-05 DIAGNOSIS — I25.2 OLD MYOCARDIAL INFARCTION: ICD-10-CM

## 2024-11-05 DIAGNOSIS — I25.110 ATHEROSCLEROTIC HEART DISEASE OF NATIVE CORONARY ARTERY WITH UNSTABLE ANGINA PECTORIS: ICD-10-CM

## 2024-12-09 NOTE — ED ADULT TRIAGE NOTE - WEIGHT IN LBS
Sputum culture positive for 4+ growth MRSA and mixed respiratory keely.  Given persistent oxygen requirement would escalate to IV vancomycin.     199.9